# Patient Record
Sex: MALE | Race: WHITE | NOT HISPANIC OR LATINO | Employment: OTHER | URBAN - METROPOLITAN AREA
[De-identification: names, ages, dates, MRNs, and addresses within clinical notes are randomized per-mention and may not be internally consistent; named-entity substitution may affect disease eponyms.]

---

## 2022-03-14 ENCOUNTER — HOSPITAL ENCOUNTER (OUTPATIENT)
Dept: RADIOLOGY | Facility: CLINIC | Age: 37
Discharge: HOME | End: 2022-03-14
Attending: PHYSICIAN ASSISTANT
Payer: OTHER GOVERNMENT

## 2022-03-14 ENCOUNTER — OFFICE VISIT (OUTPATIENT)
Dept: URGENT CARE | Facility: CLINIC | Age: 37
End: 2022-03-14
Payer: OTHER GOVERNMENT

## 2022-03-14 VITALS
OXYGEN SATURATION: 95 % | SYSTOLIC BLOOD PRESSURE: 126 MMHG | BODY MASS INDEX: 42.95 KG/M2 | WEIGHT: 300 LBS | TEMPERATURE: 97 F | HEART RATE: 60 BPM | DIASTOLIC BLOOD PRESSURE: 76 MMHG | HEIGHT: 70 IN | RESPIRATION RATE: 18 BRPM

## 2022-03-14 DIAGNOSIS — J98.8 BACTERIAL RESPIRATORY INFECTION: Primary | ICD-10-CM

## 2022-03-14 DIAGNOSIS — J45.909 REACTIVE AIRWAY DISEASE WITH WHEEZING, UNSPECIFIED ASTHMA SEVERITY, UNCOMPLICATED: ICD-10-CM

## 2022-03-14 DIAGNOSIS — R05.8 PRODUCTIVE COUGH: ICD-10-CM

## 2022-03-14 DIAGNOSIS — B96.89 BACTERIAL RESPIRATORY INFECTION: Primary | ICD-10-CM

## 2022-03-14 PROCEDURE — 71046 XR CHEST PA AND LATERAL: ICD-10-PCS | Mod: S$GLB,,, | Performed by: RADIOLOGY

## 2022-03-14 PROCEDURE — 99203 OFFICE O/P NEW LOW 30 MIN: CPT | Mod: S$GLB,,, | Performed by: PHYSICIAN ASSISTANT

## 2022-03-14 PROCEDURE — 99203 PR OFFICE/OUTPT VISIT, NEW, LEVL III, 30-44 MIN: ICD-10-PCS | Mod: S$GLB,,, | Performed by: PHYSICIAN ASSISTANT

## 2022-03-14 PROCEDURE — 71046 X-RAY EXAM CHEST 2 VIEWS: CPT | Mod: S$GLB,,, | Performed by: RADIOLOGY

## 2022-03-14 RX ORDER — LORATADINE 10 MG/1
10 TABLET ORAL DAILY
COMMUNITY
End: 2022-12-02

## 2022-03-14 RX ORDER — MONTELUKAST SODIUM 10 MG/1
10 TABLET ORAL NIGHTLY
COMMUNITY

## 2022-03-14 RX ORDER — BUDESONIDE AND FORMOTEROL FUMARATE DIHYDRATE 80; 4.5 UG/1; UG/1
2 AEROSOL RESPIRATORY (INHALATION)
COMMUNITY
End: 2023-10-23 | Stop reason: SDUPTHER

## 2022-03-14 RX ORDER — ALBUTEROL SULFATE 1.25 MG/3ML
1.25 SOLUTION RESPIRATORY (INHALATION) EVERY 6 HOURS PRN
COMMUNITY

## 2022-03-14 RX ORDER — VENLAFAXINE 75 MG/1
75 TABLET ORAL DAILY
COMMUNITY
End: 2023-08-23

## 2022-03-14 RX ORDER — OMEPRAZOLE 40 MG/1
40 CAPSULE, DELAYED RELEASE ORAL DAILY
COMMUNITY

## 2022-03-14 RX ORDER — PROMETHAZINE HYDROCHLORIDE AND DEXTROMETHORPHAN HYDROBROMIDE 6.25; 15 MG/5ML; MG/5ML
5 SYRUP ORAL EVERY 6 HOURS PRN
Qty: 118 ML | Refills: 0 | Status: SHIPPED | OUTPATIENT
Start: 2022-03-14 | End: 2022-05-11

## 2022-03-14 RX ORDER — PREDNISONE 20 MG/1
TABLET ORAL
Qty: 7 TABLET | Refills: 0 | Status: SHIPPED | OUTPATIENT
Start: 2022-03-14 | End: 2022-05-11

## 2022-03-14 RX ORDER — DOXYCYCLINE 100 MG/1
100 CAPSULE ORAL 2 TIMES DAILY
Qty: 14 CAPSULE | Refills: 0 | Status: SHIPPED | OUTPATIENT
Start: 2022-03-14 | End: 2022-03-21

## 2022-03-14 NOTE — PROGRESS NOTES
"Subjective:       Patient ID: Elton Barrios is a 36 y.o. male.    Vitals:  height is 5' 10" (1.778 m) and weight is 136.1 kg (300 lb). His temperature is 97.4 °F (36.3 °C). His blood pressure is 126/76 and his pulse is 60. His respiration is 18 and oxygen saturation is 95%.     Chief Complaint: Sinus Problem and Cough    Patient is 36-year-old male who presents to clinic with sinus congestion/pressure, chest congestion and productive cough for 1 week.  He has been taking Mucinex, Flonase and his daily antihistamines with no relief.  Patient states that the cough seems to has gotten worse since onset.  He does have history of asthma and states that he has used his inhaler more than he usually does.  He does have nebulizer treatments at home but states he has not needed them. Denies any fever, chest pain. No covid of flu exposure.     Sinus Problem  This is a new problem. The current episode started in the past 7 days (3/9/22). The problem has been gradually worsening since onset. There has been no fever. His pain is at a severity of 0/10. He is experiencing no pain. Associated symptoms include congestion, coughing, diaphoresis, ear pain, headaches, a hoarse voice, shortness of breath, sinus pressure and a sore throat. Pertinent negatives include no chills, sneezing or swollen glands. (Plugged ears) Treatments tried: flonase, mucinex, allergy pills. The treatment provided no relief.   Cough  Associated symptoms include ear pain, headaches, a sore throat and shortness of breath. Pertinent negatives include no chills, fever or myalgias.       Constitution: Positive for sweating. Negative for chills and fever.   HENT: Positive for ear pain, congestion, sinus pressure and sore throat. Negative for trouble swallowing and voice change.    Cardiovascular: Negative.    Respiratory: Positive for cough, sputum production, shortness of breath and asthma.    Gastrointestinal: Negative.    Musculoskeletal: Negative for muscle " ache.   Skin: Negative.    Allergic/Immunologic: Positive for asthma. Negative for sneezing.   Neurological: Positive for headaches.       Objective:      Physical Exam   Constitutional: He appears well-developed.  Non-toxic appearance. He does not appear ill. No distress.   HENT:   Head: Normocephalic and atraumatic.   Ears:   Right Ear: Tympanic membrane and external ear normal.   Left Ear: Tympanic membrane and external ear normal.   Nose: Mucosal edema and congestion present.   Eyes: Conjunctivae and EOM are normal.   Neck: Neck supple.   Pulmonary/Chest: Effort normal. No respiratory distress. He has wheezes (expiratory wheezes throughout).    Comments: Frequent coughing    Abdominal: Normal appearance.   Musculoskeletal: Normal range of motion.         General: Normal range of motion.   Neurological: no focal deficit. He is alert. He displays no weakness. Gait normal.   Skin: Skin is warm, dry, not diaphoretic, not pale and no rash.   Psychiatric: His behavior is normal.       XR CHEST PA AND LATERAL    Result Date: 3/14/2022  EXAMINATION: XR CHEST PA AND LATERAL CLINICAL HISTORY: Other specified cough TECHNIQUE: PA and lateral views of the chest were performed. COMPARISON: None. FINDINGS: No focal or confluent pulmonary infiltrate. No pleural fluid or pneumothorax. The cardiomediastinal silhouette is unremarkable Osseous structures are intact.     Negative chest. Electronically signed by: Aman Zaldivar Date:    03/14/2022 Time:    19:09    Assessment:       1. Bacterial respiratory infection    2. Productive cough    3. Reactive airway disease with wheezing, unspecified asthma severity, uncomplicated          Plan:       No acute distress or c/o sob but offered to do breathing treatment in clinic. Pt declined and says he will continue his inhaler and home treatments as needed. Take full course of antibiotics and cough syrup prn. Recommend close f/u with PCP if symptoms worsen or fail to improve.     Bacterial  respiratory infection  -     doxycycline (VIBRAMYCIN) 100 MG Cap; Take 1 capsule (100 mg total) by mouth 2 (two) times daily. for 7 days  Dispense: 14 capsule; Refill: 0    Productive cough  -     XR CHEST PA AND LATERAL; Future; Expected date: 03/14/2022  -     promethazine-dextromethorphan (PROMETHAZINE-DM) 6.25-15 mg/5 mL Syrp; Take 5 mLs by mouth every 6 (six) hours as needed (cough).  Dispense: 118 mL; Refill: 0  -     predniSONE (DELTASONE) 20 MG tablet; Take 1 tablet twice daily for 2 days. Then take 1 tablet daily for 3 days.  Dispense: 7 tablet; Refill: 0    Reactive airway disease with wheezing, unspecified asthma severity, uncomplicated  -     XR CHEST PA AND LATERAL; Future; Expected date: 03/14/2022  -     predniSONE (DELTASONE) 20 MG tablet; Take 1 tablet twice daily for 2 days. Then take 1 tablet daily for 3 days.  Dispense: 7 tablet; Refill: 0

## 2022-03-17 ENCOUNTER — TELEPHONE (OUTPATIENT)
Dept: URGENT CARE | Facility: CLINIC | Age: 37
End: 2022-03-17
Payer: OTHER GOVERNMENT

## 2022-03-17 NOTE — TELEPHONE ENCOUNTER
Courtesy call to patient. Patient states he is feeling much better and has no questions or concerns.

## 2022-05-11 ENCOUNTER — OFFICE VISIT (OUTPATIENT)
Dept: FAMILY MEDICINE | Facility: CLINIC | Age: 37
End: 2022-05-11
Payer: OTHER GOVERNMENT

## 2022-05-11 VITALS
DIASTOLIC BLOOD PRESSURE: 76 MMHG | SYSTOLIC BLOOD PRESSURE: 120 MMHG | WEIGHT: 307 LBS | TEMPERATURE: 98 F | HEIGHT: 70 IN | BODY MASS INDEX: 43.95 KG/M2 | HEART RATE: 69 BPM | OXYGEN SATURATION: 97 %

## 2022-05-11 DIAGNOSIS — F90.2 ATTENTION DEFICIT HYPERACTIVITY DISORDER (ADHD), COMBINED TYPE: ICD-10-CM

## 2022-05-11 DIAGNOSIS — F43.10 PTSD (POST-TRAUMATIC STRESS DISORDER): ICD-10-CM

## 2022-05-11 DIAGNOSIS — E66.01 CLASS 3 SEVERE OBESITY WITH BODY MASS INDEX (BMI) OF 40.0 TO 44.9 IN ADULT, UNSPECIFIED OBESITY TYPE, UNSPECIFIED WHETHER SERIOUS COMORBIDITY PRESENT: ICD-10-CM

## 2022-05-11 DIAGNOSIS — Z23 IMMUNIZATION DUE: Primary | ICD-10-CM

## 2022-05-11 DIAGNOSIS — J45.20 MILD INTERMITTENT ASTHMA WITHOUT COMPLICATION: ICD-10-CM

## 2022-05-11 DIAGNOSIS — Z11.59 NEED FOR HEPATITIS C SCREENING TEST: ICD-10-CM

## 2022-05-11 DIAGNOSIS — K21.9 GASTROESOPHAGEAL REFLUX DISEASE WITHOUT ESOPHAGITIS: ICD-10-CM

## 2022-05-11 DIAGNOSIS — Z11.4 SCREENING FOR HIV (HUMAN IMMUNODEFICIENCY VIRUS): ICD-10-CM

## 2022-05-11 PROBLEM — E66.813 CLASS 3 SEVERE OBESITY WITH BODY MASS INDEX (BMI) OF 40.0 TO 44.9 IN ADULT: Status: ACTIVE | Noted: 2022-05-11

## 2022-05-11 PROCEDURE — 99999 PR PBB SHADOW E&M-EST. PATIENT-LVL IV: CPT | Mod: PBBFAC,,, | Performed by: STUDENT IN AN ORGANIZED HEALTH CARE EDUCATION/TRAINING PROGRAM

## 2022-05-11 PROCEDURE — 90677 PCV20 VACCINE IM: CPT | Mod: PBBFAC,PO

## 2022-05-11 PROCEDURE — 99204 PR OFFICE/OUTPT VISIT, NEW, LEVL IV, 45-59 MIN: ICD-10-PCS | Mod: S$PBB,,, | Performed by: STUDENT IN AN ORGANIZED HEALTH CARE EDUCATION/TRAINING PROGRAM

## 2022-05-11 PROCEDURE — 99204 OFFICE O/P NEW MOD 45 MIN: CPT | Mod: S$PBB,,, | Performed by: STUDENT IN AN ORGANIZED HEALTH CARE EDUCATION/TRAINING PROGRAM

## 2022-05-11 PROCEDURE — 99999 PR PBB SHADOW E&M-EST. PATIENT-LVL IV: ICD-10-PCS | Mod: PBBFAC,,, | Performed by: STUDENT IN AN ORGANIZED HEALTH CARE EDUCATION/TRAINING PROGRAM

## 2022-05-11 PROCEDURE — 99214 OFFICE O/P EST MOD 30 MIN: CPT | Mod: PBBFAC,PO | Performed by: STUDENT IN AN ORGANIZED HEALTH CARE EDUCATION/TRAINING PROGRAM

## 2022-05-11 RX ORDER — METHYLPHENIDATE HYDROCHLORIDE 10 MG/1
10 CAPSULE, EXTENDED RELEASE ORAL EVERY MORNING
Qty: 30 CAPSULE | Refills: 0 | Status: SHIPPED | OUTPATIENT
Start: 2022-05-11 | End: 2022-06-08

## 2022-05-11 NOTE — PROGRESS NOTES
Problem List Items Addressed This Visit        Psychiatric    PTSD (post-traumatic stress disorder)    Overview     War ; stable on Effexor 75 mg   Denies SI/HI; hallucinations              Attention deficit hyperactivity disorder (ADHD), combined type    Overview     Chronic hx; previously on methylphenidate 20mg daily; has been off for few years. Would like to restart  - will start methylphenidate 10mg daily    Denies mood instability, irritability, aggression, palpitations, chest pain, weight loss, decreased appetite, disordered sleep  Pt is demonstrating no behaviors to suggest inappropriate use of prescribed medications.    [place] Board [employer] Pharmacy Controlled Prescription Drug Monitoring database was queried and showed no activity to suggest abuse, diversion, or other inappropriate use of prescription medications.  #30 tabs, 0 refills   1m VV             Relevant Medications    methylphenidate HCl (RITALIN [place]) 10 MG 24 hr capsule    Other Relevant Orders    CBC Auto Differential    Comprehensive Metabolic Panel    Hemoglobin A1C    Lipid Panel    TSH       Pulmonary    Mild intermittent asthma    Overview     Well controlled with Symbicort, Claritin, and singular; seldom use of albuterol              Endocrine    Class 3 severe obesity with body mass index (BMI) of 40.0 to 44.9 in adult    Overview     The patient and I had a discussion about obesity and ways to treat it.  At this time, we agreed to use the following to address this:    General weight loss/lifestyle modification strategies discussed (elicit support from others; identify saboteurs; non-food rewards, etc).  Informal exercise measures discussed, e.g. taking stairs instead of elevator.  Regular aerobic exercise program discussed.                Relevant Orders    CBC Auto Differential    Comprehensive Metabolic Panel    Hemoglobin A1C    Lipid Panel    TSH       GI    Gastroesophageal reflux disease without esophagitis    Overview      -symptoms controlled with daily PPI  -denies alarm symptoms, such as dysphagia, weight loss or N/V  -continue lifestyle modification with avoidance of acidic foods, caffeine, late night eating               Relevant Orders    CBC Auto Differential    Comprehensive Metabolic Panel    Hemoglobin A1C    Lipid Panel    TSH      Other Visit Diagnoses     Immunization due    -  Primary    Relevant Orders    (In Office Administered) Pneumococcal Conjugate Vaccine (20 Valent) (IM) (Completed)    Screening for HIV (human immunodeficiency virus)        Relevant Orders    HIV 1/2 Ag/Ab (4th Gen)    Need for hepatitis C screening test        Relevant Orders    Hepatitis C Antibody            Patient ID: Elton Barrios is a 37 y.o. male.    Chief Complaint:  establish care    [facility]      Patient is here to establish care. Has a hx of  has a past medical history of ADHD (attention deficit hyperactivity disorder), Allergy, Anxiety, Asthma, Depression, and GERD (gastroesophageal reflux disease).       Pt diagnosed with ADD/ADHD in 2013. Has tried methylphenidate 20mg, last had couple years ago. This medication helps to control lack of focus. Denies mood instability, irritability, aggression, palpitations, chest pain, weight loss, decreased appetite, disordered sleep.        Colonoscopy: No personal history of colon cancer, hematochezia, melena, crohn's, ulcerative colitis; No family history of colon cancer.      Health Maintenance Topics with due status: Not Due       Topic Last Completion Date    Influenza Vaccine Not Due        ==============================================  History reviewed.   Health Maintenance Due   Topic Date Due    Hepatitis C Screening  Never done    Lipid Panel  Never done    HIV Screening  Never done    TETANUS VACCINE  Never done    COVID-19 Vaccine (2 - Moderna series) 01/11/2022       Past Medical History:  Past Medical History:   Diagnosis Date    ADHD (attention deficit  hyperactivity disorder)     Allergy     Anxiety     Asthma     Depression     GERD (gastroesophageal reflux disease)      Past Surgical History:   Procedure Laterality Date    ANKLE FRACTURE SURGERY Left 2006    ANKLE SURGERY Left 2006    bone infection    APPENDECTOMY      KNEE ASPIRATION Right 2005     Review of patient's allergies indicates:  No Known Allergies  Current Outpatient Medications on File Prior to Visit   Medication Sig Dispense Refill    albuterol (ACCUNEB) 1.25 mg/3 mL Nebu Take 1.25 mg by nebulization every 6 (six) hours as needed. Rescue      budesonide-formoterol 80-4.5 mcg (SYMBICORT) 80-4.5 mcg/actuation HFAA Inhale 2 puffs into the lungs. Controller      loratadine (CLARITIN) 10 mg tablet Take 10 mg by mouth once daily.      montelukast (SINGULAIR) 10 mg tablet Take 10 mg by mouth every evening.      omeprazole (PRILOSEC) 40 MG capsule Take 40 mg by mouth once daily.      venlafaxine (EFFEXOR) 75 MG tablet Take 75 mg by mouth once daily at 6am.      [DISCONTINUED] predniSONE (DELTASONE) 20 MG tablet Take 1 tablet twice daily for 2 days. Then take 1 tablet daily for 3 days. (Patient not taking: Reported on 5/11/2022) 7 tablet 0    [DISCONTINUED] promethazine-dextromethorphan (PROMETHAZINE-DM) 6.25-15 mg/5 mL Syrp Take 5 mLs by mouth every 6 (six) hours as needed (cough). (Patient not taking: Reported on 5/11/2022) 118 mL 0     No current facility-administered medications on file prior to visit.     Social History     Socioeconomic History    Marital status:    Tobacco Use    Smoking status: Current Every Day Smoker     Packs/day: 0.50     Years: 20.00     Pack years: 10.00     Types: Cigarettes    Smokeless tobacco: Never Used     History reviewed. No pertinent family history.       Review of Systems   12 point review of systems per hpi, otherwise negative         Objective:    Nursing note and vitals reviewed.  Vitals:    05/11/22 1359   BP: 120/76   Pulse: 69    Temp: 98.1 °F (36.7 °C)     Body mass index is 44.05 kg/m².     Physical Exam   Constitutional:oriented to person, place, and time. appears well-developed and well-nourished. No distress.   HENT: WNL  Head: Normocephalic and atraumatic.   Eyes: Pupils are equal, round, and reactive to light. EOM are normal.   Neck: Normal range of motion. Neck supple.   Cardiovascular: Normal rate, regular rhythm, normal heart sounds and intact distal pulses.   No murmur heard.  Pulmonary/Chest: Effort normal and breath sounds normal. No respiratory distress. no wheezes.   GI: soft, non distended, no ttp, no rebound/guarding  Musculoskeletal: Normal range of motion. no edema.   Neurological: alert and oriented to person, place, and time. No cranial nerve deficit.   Skin: Skin is warm and dry. Capillary refill takes less than 2 seconds.   Psychiatric: normal mood and affect. behavior is normal.           Kena Bourgeois MD    We Offer Telehealth & Same Day Appointments!   Book your Telehealth appointment with me through my nurse or   Clinic appointments on [employer]!  [phone]     To Schedule appointments online, go to [employer]: [email or web address]

## 2022-05-12 ENCOUNTER — LAB VISIT (OUTPATIENT)
Dept: LAB | Facility: HOSPITAL | Age: 37
End: 2022-05-12
Attending: STUDENT IN AN ORGANIZED HEALTH CARE EDUCATION/TRAINING PROGRAM
Payer: OTHER GOVERNMENT

## 2022-05-12 DIAGNOSIS — Z11.59 NEED FOR HEPATITIS C SCREENING TEST: ICD-10-CM

## 2022-05-12 DIAGNOSIS — E66.01 CLASS 3 SEVERE OBESITY WITH BODY MASS INDEX (BMI) OF 40.0 TO 44.9 IN ADULT, UNSPECIFIED OBESITY TYPE, UNSPECIFIED WHETHER SERIOUS COMORBIDITY PRESENT: ICD-10-CM

## 2022-05-12 DIAGNOSIS — K21.9 GASTROESOPHAGEAL REFLUX DISEASE WITHOUT ESOPHAGITIS: ICD-10-CM

## 2022-05-12 DIAGNOSIS — F90.2 ATTENTION DEFICIT HYPERACTIVITY DISORDER (ADHD), COMBINED TYPE: ICD-10-CM

## 2022-05-12 DIAGNOSIS — Z11.4 SCREENING FOR HIV (HUMAN IMMUNODEFICIENCY VIRUS): ICD-10-CM

## 2022-05-12 LAB
ALBUMIN SERPL BCP-MCNC: 4.1 G/DL (ref 3.5–5.2)
ALP SERPL-CCNC: 98 U/L (ref 55–135)
ALT SERPL W/O P-5'-P-CCNC: 35 U/L (ref 10–44)
ANION GAP SERPL CALC-SCNC: 9 MMOL/L (ref 8–16)
AST SERPL-CCNC: 28 U/L (ref 10–40)
BASOPHILS # BLD AUTO: 0.04 K/UL (ref 0–0.2)
BASOPHILS NFR BLD: 0.4 % (ref 0–1.9)
BILIRUB SERPL-MCNC: 0.5 MG/DL (ref 0.1–1)
BUN SERPL-MCNC: 13 MG/DL (ref 6–20)
CALCIUM SERPL-MCNC: 9.5 MG/DL (ref 8.7–10.5)
CHLORIDE SERPL-SCNC: 104 MMOL/L (ref 95–110)
CHOLEST SERPL-MCNC: 213 MG/DL (ref 120–199)
CHOLEST/HDLC SERPL: 6.9 {RATIO} (ref 2–5)
CO2 SERPL-SCNC: 26 MMOL/L (ref 23–29)
CREAT SERPL-MCNC: 0.8 MG/DL (ref 0.5–1.4)
DIFFERENTIAL METHOD: ABNORMAL
EOSINOPHIL # BLD AUTO: 0.3 K/UL (ref 0–0.5)
EOSINOPHIL NFR BLD: 2.7 % (ref 0–8)
ERYTHROCYTE [DISTWIDTH] IN BLOOD BY AUTOMATED COUNT: 12.9 % (ref 11.5–14.5)
EST. GFR  (AFRICAN AMERICAN): >60 ML/MIN/1.73 M^2
EST. GFR  (NON AFRICAN AMERICAN): >60 ML/MIN/1.73 M^2
ESTIMATED AVG GLUCOSE: 117 MG/DL (ref 68–131)
GLUCOSE SERPL-MCNC: 113 MG/DL (ref 70–110)
HBA1C MFR BLD: 5.7 % (ref 4–5.6)
HCT VFR BLD AUTO: 46.6 % (ref 40–54)
HDLC SERPL-MCNC: 31 MG/DL (ref 40–75)
HDLC SERPL: 14.6 % (ref 20–50)
HGB BLD-MCNC: 14.8 G/DL (ref 14–18)
IMM GRANULOCYTES # BLD AUTO: 0.03 K/UL (ref 0–0.04)
IMM GRANULOCYTES NFR BLD AUTO: 0.3 % (ref 0–0.5)
LDLC SERPL CALC-MCNC: 113.8 MG/DL (ref 63–159)
LYMPHOCYTES # BLD AUTO: 3.2 K/UL (ref 1–4.8)
LYMPHOCYTES NFR BLD: 28.4 % (ref 18–48)
MCH RBC QN AUTO: 27.7 PG (ref 27–31)
MCHC RBC AUTO-ENTMCNC: 31.8 G/DL (ref 32–36)
MCV RBC AUTO: 87 FL (ref 82–98)
MONOCYTES # BLD AUTO: 1 K/UL (ref 0.3–1)
MONOCYTES NFR BLD: 8.8 % (ref 4–15)
NEUTROPHILS # BLD AUTO: 6.7 K/UL (ref 1.8–7.7)
NEUTROPHILS NFR BLD: 59.4 % (ref 38–73)
NONHDLC SERPL-MCNC: 182 MG/DL
NRBC BLD-RTO: 0 /100 WBC
PLATELET # BLD AUTO: 257 K/UL (ref 150–450)
PMV BLD AUTO: 9.9 FL (ref 9.2–12.9)
POTASSIUM SERPL-SCNC: 4.1 MMOL/L (ref 3.5–5.1)
PROT SERPL-MCNC: 6.9 G/DL (ref 6–8.4)
RBC # BLD AUTO: 5.34 M/UL (ref 4.6–6.2)
SODIUM SERPL-SCNC: 139 MMOL/L (ref 136–145)
TRIGL SERPL-MCNC: 341 MG/DL (ref 30–150)
TSH SERPL DL<=0.005 MIU/L-ACNC: 1.07 UIU/ML (ref 0.4–4)
WBC # BLD AUTO: 11.32 K/UL (ref 3.9–12.7)

## 2022-05-12 PROCEDURE — 84443 ASSAY THYROID STIM HORMONE: CPT | Performed by: STUDENT IN AN ORGANIZED HEALTH CARE EDUCATION/TRAINING PROGRAM

## 2022-05-12 PROCEDURE — 86803 HEPATITIS C AB TEST: CPT | Performed by: STUDENT IN AN ORGANIZED HEALTH CARE EDUCATION/TRAINING PROGRAM

## 2022-05-12 PROCEDURE — 87389 HIV-1 AG W/HIV-1&-2 AB AG IA: CPT | Performed by: STUDENT IN AN ORGANIZED HEALTH CARE EDUCATION/TRAINING PROGRAM

## 2022-05-12 PROCEDURE — 85025 COMPLETE CBC W/AUTO DIFF WBC: CPT | Performed by: STUDENT IN AN ORGANIZED HEALTH CARE EDUCATION/TRAINING PROGRAM

## 2022-05-12 PROCEDURE — 83036 HEMOGLOBIN GLYCOSYLATED A1C: CPT | Performed by: STUDENT IN AN ORGANIZED HEALTH CARE EDUCATION/TRAINING PROGRAM

## 2022-05-12 PROCEDURE — 36415 COLL VENOUS BLD VENIPUNCTURE: CPT | Mod: PO | Performed by: STUDENT IN AN ORGANIZED HEALTH CARE EDUCATION/TRAINING PROGRAM

## 2022-05-12 PROCEDURE — 80053 COMPREHEN METABOLIC PANEL: CPT | Performed by: STUDENT IN AN ORGANIZED HEALTH CARE EDUCATION/TRAINING PROGRAM

## 2022-05-12 PROCEDURE — 80061 LIPID PANEL: CPT | Performed by: STUDENT IN AN ORGANIZED HEALTH CARE EDUCATION/TRAINING PROGRAM

## 2022-05-13 DIAGNOSIS — E78.00 HYPERCHOLESTEROLEMIA: ICD-10-CM

## 2022-05-13 DIAGNOSIS — R73.03 PREDIABETES: Primary | ICD-10-CM

## 2022-05-13 RX ORDER — METFORMIN HYDROCHLORIDE 500 MG/1
500 TABLET, EXTENDED RELEASE ORAL NIGHTLY
Qty: 90 TABLET | Refills: 3 | Status: SHIPPED | OUTPATIENT
Start: 2022-05-13 | End: 2022-10-17 | Stop reason: SDUPTHER

## 2022-05-15 ENCOUNTER — PATIENT MESSAGE (OUTPATIENT)
Dept: ADMINISTRATIVE | Facility: OTHER | Age: 37
End: 2022-05-15
Payer: OTHER GOVERNMENT

## 2022-05-16 ENCOUNTER — LAB VISIT (OUTPATIENT)
Dept: PRIMARY CARE CLINIC | Facility: OTHER | Age: 37
End: 2022-05-16
Attending: INTERNAL MEDICINE
Payer: OTHER GOVERNMENT

## 2022-05-16 DIAGNOSIS — Z20.822 ENCOUNTER FOR LABORATORY TESTING FOR COVID-19 VIRUS: ICD-10-CM

## 2022-05-16 PROCEDURE — U0003 INFECTIOUS AGENT DETECTION BY NUCLEIC ACID (DNA OR RNA); SEVERE ACUTE RESPIRATORY SYNDROME CORONAVIRUS 2 (SARS-COV-2) (CORONAVIRUS DISEASE [COVID-19]), AMPLIFIED PROBE TECHNIQUE, MAKING USE OF HIGH THROUGHPUT TECHNOLOGIES AS DESCRIBED BY CMS-2020-01-R: HCPCS | Performed by: INTERNAL MEDICINE

## 2022-05-17 LAB
HCV AB SERPL QL IA: NEGATIVE
HIV 1+2 AB+HIV1 P24 AG SERPL QL IA: NEGATIVE
SARS-COV-2 RNA RESP QL NAA+PROBE: NOT DETECTED
SARS-COV-2- CYCLE NUMBER: NORMAL

## 2022-05-30 ENCOUNTER — PATIENT MESSAGE (OUTPATIENT)
Dept: FAMILY MEDICINE | Facility: CLINIC | Age: 37
End: 2022-05-30
Payer: OTHER GOVERNMENT

## 2022-06-08 ENCOUNTER — OFFICE VISIT (OUTPATIENT)
Dept: FAMILY MEDICINE | Facility: CLINIC | Age: 37
End: 2022-06-08
Payer: OTHER GOVERNMENT

## 2022-06-08 DIAGNOSIS — F90.2 ATTENTION DEFICIT HYPERACTIVITY DISORDER (ADHD), COMBINED TYPE: Primary | ICD-10-CM

## 2022-06-08 PROCEDURE — 99441 PR PHYSICIAN TELEPHONE EVALUATION 5-10 MIN: CPT | Mod: 95,,, | Performed by: STUDENT IN AN ORGANIZED HEALTH CARE EDUCATION/TRAINING PROGRAM

## 2022-06-08 PROCEDURE — 99441 PR PHYSICIAN TELEPHONE EVALUATION 5-10 MIN: ICD-10-PCS | Mod: 95,,, | Performed by: STUDENT IN AN ORGANIZED HEALTH CARE EDUCATION/TRAINING PROGRAM

## 2022-06-08 RX ORDER — METHYLPHENIDATE HYDROCHLORIDE 10 MG/1
10 TABLET ORAL 2 TIMES DAILY WITH MEALS
Qty: 60 TABLET | Refills: 0 | Status: SHIPPED | OUTPATIENT
Start: 2022-06-08 | End: 2022-09-06 | Stop reason: SDUPTHER

## 2022-06-08 NOTE — PROGRESS NOTES
Established Patient - Audio Only Telehealth Visit     The patient location is: [place]  The chief complaint leading to consultation is: ADD  Visit type: Virtual visit with audio only (telephone)  Total time spent with patient: 10 minutes       The reason for the audio only service rather than synchronous audio and video virtual visit was related to technical difficulties or patient preference/necessity.     Each patient to whom I provide medical services by telemedicine is:  (1) informed of the relationship between the physician and patient and the respective role of any other health care provider with respect to management of the patient; and (2) notified that they may decline to receive medical services by telemedicine and may withdraw from such care at any time. Patient verbally consented to receive this service via voice-only telephone call.       HPI:   Pt diagnosed with ADD/ADHD in 2013. Has tried methylphenidate 20mg, last had couple years ago. Restarted on ritalin 10 mg daily 1 month ago. Reports 10 mg daily only lasts until noon. This medication helps to control lack of focus. Denies mood instability, irritability, aggression, palpitations, chest pain, weight loss, decreased appetite, disordered sleep.             Problem List Items Addressed This Visit        Psychiatric    Attention deficit hyperactivity disorder (ADHD), combined type - Primary    Overview     Chronic hx; previously on methylphenidate 20mg daily  Reports 10 mg daily only lasts until noon.  - will increase to methylphenidate 10 mg BID    Denies mood instability, irritability, aggression, palpitations, chest pain, weight loss, decreased appetite, disordered sleep  Pt is demonstrating no behaviors to suggest inappropriate use of prescribed medications.    [place] Board of Pharmacy Controlled Prescription Drug Monitoring database was queried and showed no activity to suggest abuse, diversion, or other inappropriate use of prescription  medications.  #30 tabs, 0 refills   1m VV                                This service was not originating from a related E/M service provided within the previous 7 days nor will  to an E/M service or procedure within the next 24 hours or my soonest available appointment.  Prevailing standard of care was able to be met in this audio-only visit.        Answers for HPI/ROS submitted by the patient on 6/8/2022  activity change: No  unexpected weight change: No  neck pain: No  hearing loss: No  rhinorrhea: No  trouble swallowing: No  eye discharge: No  visual disturbance: No  chest tightness: No  wheezing: No  chest pain: No  palpitations: No  blood in stool: No  constipation: No  vomiting: No  diarrhea: No  polydipsia: No  polyuria: No  difficulty urinating: No  urgency: No  hematuria: No  joint swelling: No  arthralgias: No  headaches: No  weakness: No  confusion: No  dysphoric mood: No

## 2022-09-06 ENCOUNTER — OFFICE VISIT (OUTPATIENT)
Dept: FAMILY MEDICINE | Facility: CLINIC | Age: 37
End: 2022-09-06
Payer: OTHER GOVERNMENT

## 2022-09-06 DIAGNOSIS — E66.01 CLASS 3 SEVERE OBESITY DUE TO EXCESS CALORIES WITH SERIOUS COMORBIDITY AND BODY MASS INDEX (BMI) OF 40.0 TO 44.9 IN ADULT: ICD-10-CM

## 2022-09-06 DIAGNOSIS — F90.2 ATTENTION DEFICIT HYPERACTIVITY DISORDER (ADHD), COMBINED TYPE: ICD-10-CM

## 2022-09-06 DIAGNOSIS — R53.83 FATIGUE, UNSPECIFIED TYPE: Primary | ICD-10-CM

## 2022-09-06 DIAGNOSIS — R53.83 OTHER FATIGUE: ICD-10-CM

## 2022-09-06 PROCEDURE — 99214 PR OFFICE/OUTPT VISIT, EST, LEVL IV, 30-39 MIN: ICD-10-PCS | Mod: 95,,, | Performed by: STUDENT IN AN ORGANIZED HEALTH CARE EDUCATION/TRAINING PROGRAM

## 2022-09-06 PROCEDURE — 99214 OFFICE O/P EST MOD 30 MIN: CPT | Mod: 95,,, | Performed by: STUDENT IN AN ORGANIZED HEALTH CARE EDUCATION/TRAINING PROGRAM

## 2022-09-06 RX ORDER — METHYLPHENIDATE HYDROCHLORIDE 10 MG/1
10 TABLET ORAL 2 TIMES DAILY WITH MEALS
Qty: 60 TABLET | Refills: 0 | Status: SHIPPED | OUTPATIENT
Start: 2022-09-06 | End: 2022-10-06

## 2022-09-06 RX ORDER — METHYLPHENIDATE HYDROCHLORIDE 10 MG/1
10 TABLET ORAL 2 TIMES DAILY WITH MEALS
Qty: 60 TABLET | Refills: 0 | Status: SHIPPED | OUTPATIENT
Start: 2022-10-06 | End: 2022-11-05

## 2022-09-06 RX ORDER — METHYLPHENIDATE HYDROCHLORIDE 10 MG/1
10 TABLET ORAL 2 TIMES DAILY WITH MEALS
Qty: 60 TABLET | Refills: 0 | Status: CANCELLED | OUTPATIENT
Start: 2022-09-06 | End: 2022-10-06

## 2022-09-06 RX ORDER — METHYLPHENIDATE HYDROCHLORIDE 10 MG/1
10 TABLET ORAL 2 TIMES DAILY WITH MEALS
Qty: 60 TABLET | Refills: 0 | Status: SHIPPED | OUTPATIENT
Start: 2022-11-06 | End: 2022-12-06

## 2022-09-06 NOTE — PROGRESS NOTES
The patient location is: [place]  The chief complaint leading to consultation is: add med refill    Visit type: audiovisual    Time with patient: 10 minutes  15 minutes of total time spent on the encounter, which includes face to face time and non-face to face time preparing to see the patient (eg, review of tests), Obtaining and/or reviewing separately obtained history, Documenting clinical information in the electronic or other health record, Independently interpreting results (not separately reported) and communicating results to the patient/family/caregiver, or Care coordination (not separately reported).     Each patient to whom he or she provides medical services by telemedicine is:  (1) informed of the relationship between the physician and patient and the respective role of any other health care provider with respect to management of the patient; and (2) notified that he or she may decline to receive medical services by telemedicine and may withdraw from such care at any time.       Problem List Items Addressed This Visit          Psychiatric    Attention deficit hyperactivity disorder (ADHD), combined type    Overview     Chronic hx; doing well on methylphenidate 10 mg BID    Denies mood instability, irritability, aggression, palpitations, chest pain, weight loss, decreased appetite, disordered sleep  Pt is demonstrating no behaviors to suggest inappropriate use of prescribed medications.    [place] Board of Pharmacy Controlled Prescription Drug Monitoring database was queried and showed no activity to suggest abuse, diversion, or other inappropriate use of prescription medications.  #60 tabs, 2 refills   3m in office            Relevant Medications    methylphenidate HCl (RITALIN) 10 MG tablet    methylphenidate HCl (RITALIN) 10 MG tablet (Start on 10/6/2022)    methylphenidate HCl (RITALIN) 10 MG tablet (Start on 11/6/2022)       Endocrine    Class 3 severe obesity with body mass index (BMI) of 40.0 to 44.9  in adult    Overview     Wt Readings from Last 3 Encounters:   05/11/22 1359 (!) 139.3 kg (307 lb)   03/14/22 1818 136.1 kg (300 lb)   General weight loss/lifestyle modification strategies discussed: limit sugary drinks, exercise 3-5x per week  Informal exercise measures discussed, e.g. taking stairs instead of elevator.                    Other    Fatigue - Primary    Overview     New problem. Denies assoc anxiety and depression  SHAYY on CPAP   - CBC in May wnl  - send for testosterone, bmp, a1c, UA   Prn return          Relevant Orders    Hemoglobin A1C    Urinalysis    Basic Metabolic Panel    Testosterone    CBC Auto Differential           Follow up in about 3 months (around 12/6/2022) for in office.    Kena Bourgeois MD  _________________________________________________________________________      Patient ID: Elton Barrios is a 37 y.o. male.    Pt diagnosed with ADD/ADHD in 2013. Has tried methylphenidate 20mg, last had couple years ago. Restarted on ritalin 10 mg daily 1 month ago. Reports 10 mg daily only lasts until noon. This medication helps to control lack of focus. Denies mood instability, irritability, aggression, palpitations, chest pain, weight loss, decreased appetite, disordered sleep.      Fatigue for past few months with decreased libido. Reports ritalin has been helping with focus; however, not with energy. Reports he has been trying to eat better and be more active. of note, hx of shayy on cpap. Reports recent visit with pulm revealed sleeping well, about 6-8 hours nightly.    Denies fevers, chills, chest pain, SOB.      Past medical histories reviewed, including past medical, surgical, family and social histories.      Current Outpatient Medications on File Prior to Visit   Medication Sig Dispense Refill    albuterol (ACCUNEB) 1.25 mg/3 mL Nebu Take 1.25 mg by nebulization every 6 (six) hours as needed. Rescue      budesonide-formoterol 80-4.5 mcg (SYMBICORT) 80-4.5 mcg/actuation HFAA Inhale  2 puffs into the lungs. Controller      loratadine (CLARITIN) 10 mg tablet Take 10 mg by mouth once daily.      metFORMIN (GLUCOPHAGE-XR) 500 MG ER 24hr tablet Take 1 tablet (500 mg total) by mouth every evening. 90 tablet 3    montelukast (SINGULAIR) 10 mg tablet Take 10 mg by mouth every evening.      omeprazole (PRILOSEC) 40 MG capsule Take 40 mg by mouth once daily.      venlafaxine (EFFEXOR) 75 MG tablet Take 75 mg by mouth once daily at 6am.      [DISCONTINUED] methylphenidate HCl (RITALIN) 10 MG tablet Take 1 tablet (10 mg total) by mouth 2 (two) times daily with meals. 60 tablet 0     No current facility-administered medications on file prior to visit.       Review of Systems   12 point review of systems negative except for listed in HPI.     Objective:    Nursing note and vitals reviewed.  There were no vitals filed for this visit.  There is no height or weight on file to calculate BMI.     Physical Exam   No vitals or full physical exam obtained as this is a virtual visit  Gen: no distress, comfortable          We Offer Telehealth & Same Day Appointments!   Book your Telehealth appointment with me through my nurse or   Clinic appointments on [employer]!  [phone]     To Schedule appointments online, go to [employer]: [email or web address]       Answers submitted by the patient for this visit:  Review of Systems Questionnaire (Submitted on 9/6/2022)  activity change: No  unexpected weight change: No  neck pain: No  hearing loss: No  rhinorrhea: No  trouble swallowing: No  eye discharge: No  visual disturbance: No  chest tightness: No  wheezing: No  chest pain: No  palpitations: No  blood in stool: No  constipation: No  vomiting: No  diarrhea: No  polydipsia: No  polyuria: No  difficulty urinating: No  urgency: No  hematuria: No  joint swelling: No  arthralgias: Yes  headaches: No  weakness: Yes (reports able to walk, no dysarthria, no extremity weakness)   confusion: Yes (AOX3  on VV)  dysphoric mood: No

## 2022-09-07 ENCOUNTER — LAB VISIT (OUTPATIENT)
Dept: LAB | Facility: HOSPITAL | Age: 37
End: 2022-09-07
Attending: STUDENT IN AN ORGANIZED HEALTH CARE EDUCATION/TRAINING PROGRAM
Payer: OTHER GOVERNMENT

## 2022-09-07 DIAGNOSIS — R53.83 FATIGUE, UNSPECIFIED TYPE: ICD-10-CM

## 2022-09-07 LAB
ANION GAP SERPL CALC-SCNC: 13 MMOL/L (ref 8–16)
BASOPHILS # BLD AUTO: 0.04 K/UL (ref 0–0.2)
BASOPHILS NFR BLD: 0.4 % (ref 0–1.9)
BUN SERPL-MCNC: 11 MG/DL (ref 6–20)
CALCIUM SERPL-MCNC: 9.7 MG/DL (ref 8.7–10.5)
CHLORIDE SERPL-SCNC: 104 MMOL/L (ref 95–110)
CO2 SERPL-SCNC: 22 MMOL/L (ref 23–29)
CREAT SERPL-MCNC: 0.8 MG/DL (ref 0.5–1.4)
DIFFERENTIAL METHOD: NORMAL
EOSINOPHIL # BLD AUTO: 0.2 K/UL (ref 0–0.5)
EOSINOPHIL NFR BLD: 2 % (ref 0–8)
ERYTHROCYTE [DISTWIDTH] IN BLOOD BY AUTOMATED COUNT: 13.2 % (ref 11.5–14.5)
EST. GFR  (NO RACE VARIABLE): >60 ML/MIN/1.73 M^2
ESTIMATED AVG GLUCOSE: 111 MG/DL (ref 68–131)
GLUCOSE SERPL-MCNC: 91 MG/DL (ref 70–110)
HBA1C MFR BLD: 5.5 % (ref 4–5.6)
HCT VFR BLD AUTO: 42.3 % (ref 40–54)
HGB BLD-MCNC: 14.4 G/DL (ref 14–18)
IMM GRANULOCYTES # BLD AUTO: 0.02 K/UL (ref 0–0.04)
IMM GRANULOCYTES NFR BLD AUTO: 0.2 % (ref 0–0.5)
LYMPHOCYTES # BLD AUTO: 3.2 K/UL (ref 1–4.8)
LYMPHOCYTES NFR BLD: 31.5 % (ref 18–48)
MCH RBC QN AUTO: 29.3 PG (ref 27–31)
MCHC RBC AUTO-ENTMCNC: 34 G/DL (ref 32–36)
MCV RBC AUTO: 86 FL (ref 82–98)
MONOCYTES # BLD AUTO: 0.8 K/UL (ref 0.3–1)
MONOCYTES NFR BLD: 7.6 % (ref 4–15)
NEUTROPHILS # BLD AUTO: 6 K/UL (ref 1.8–7.7)
NEUTROPHILS NFR BLD: 58.3 % (ref 38–73)
NRBC BLD-RTO: 0 /100 WBC
PLATELET # BLD AUTO: 271 K/UL (ref 150–450)
PMV BLD AUTO: 9.6 FL (ref 9.2–12.9)
POTASSIUM SERPL-SCNC: 4.2 MMOL/L (ref 3.5–5.1)
RBC # BLD AUTO: 4.92 M/UL (ref 4.6–6.2)
SODIUM SERPL-SCNC: 139 MMOL/L (ref 136–145)
TESTOST SERPL-MCNC: 275 NG/DL (ref 304–1227)
WBC # BLD AUTO: 10.19 K/UL (ref 3.9–12.7)

## 2022-09-07 PROCEDURE — 80048 BASIC METABOLIC PNL TOTAL CA: CPT | Performed by: STUDENT IN AN ORGANIZED HEALTH CARE EDUCATION/TRAINING PROGRAM

## 2022-09-07 PROCEDURE — 84403 ASSAY OF TOTAL TESTOSTERONE: CPT | Performed by: STUDENT IN AN ORGANIZED HEALTH CARE EDUCATION/TRAINING PROGRAM

## 2022-09-07 PROCEDURE — 85025 COMPLETE CBC W/AUTO DIFF WBC: CPT | Performed by: STUDENT IN AN ORGANIZED HEALTH CARE EDUCATION/TRAINING PROGRAM

## 2022-09-07 PROCEDURE — 83036 HEMOGLOBIN GLYCOSYLATED A1C: CPT | Performed by: STUDENT IN AN ORGANIZED HEALTH CARE EDUCATION/TRAINING PROGRAM

## 2022-09-07 PROCEDURE — 36415 COLL VENOUS BLD VENIPUNCTURE: CPT | Mod: PO | Performed by: STUDENT IN AN ORGANIZED HEALTH CARE EDUCATION/TRAINING PROGRAM

## 2022-09-08 DIAGNOSIS — E34.9 TESTOSTERONE DEFICIENCY: Primary | ICD-10-CM

## 2022-09-27 ENCOUNTER — LAB VISIT (OUTPATIENT)
Dept: LAB | Facility: HOSPITAL | Age: 37
End: 2022-09-27
Attending: STUDENT IN AN ORGANIZED HEALTH CARE EDUCATION/TRAINING PROGRAM
Payer: OTHER GOVERNMENT

## 2022-09-27 DIAGNOSIS — E34.9 TESTOSTERONE DEFICIENCY: ICD-10-CM

## 2022-09-27 LAB — TESTOST SERPL-MCNC: 274 NG/DL (ref 304–1227)

## 2022-09-27 PROCEDURE — 84403 ASSAY OF TOTAL TESTOSTERONE: CPT | Performed by: STUDENT IN AN ORGANIZED HEALTH CARE EDUCATION/TRAINING PROGRAM

## 2022-09-27 PROCEDURE — 36415 COLL VENOUS BLD VENIPUNCTURE: CPT | Mod: PO | Performed by: STUDENT IN AN ORGANIZED HEALTH CARE EDUCATION/TRAINING PROGRAM

## 2022-09-28 ENCOUNTER — TELEPHONE (OUTPATIENT)
Dept: FAMILY MEDICINE | Facility: CLINIC | Age: 37
End: 2022-09-28
Payer: OTHER GOVERNMENT

## 2022-09-28 DIAGNOSIS — E34.9 TESTOSTERONE DEFICIENCY: Primary | ICD-10-CM

## 2022-09-28 RX ORDER — TESTOSTERONE CYPIONATE 200 MG/ML
200 INJECTION, SOLUTION INTRAMUSCULAR
Status: DISCONTINUED | OUTPATIENT
Start: 2022-09-28 | End: 2022-10-14

## 2022-09-28 RX ORDER — TESTOSTERONE CYPIONATE 200 MG/ML
200 INJECTION, SOLUTION INTRAMUSCULAR
Status: DISCONTINUED | OUTPATIENT
Start: 2022-10-05 | End: 2022-10-14

## 2022-09-28 NOTE — TELEPHONE ENCOUNTER
----- Message from Kena Bourgeois MD sent at 9/28/2022  8:36 AM CDT -----  [facility]: labs and injection      I have reviewed the labs and recommend the following:     TESTOSTERONE-ABNORMAL-The TESTOSTERONE level is abnormal.    SCHEDULE the patient to receive 200 mg of DEPO-TESTOSTERONE by injection every 2 week(s) in [facility].     PEND an order for a repeat TESTOSTERONE level in 3 months and notify the patient of the date.  Schedule a visit with me after the labs are done.

## 2022-09-28 NOTE — TELEPHONE ENCOUNTER
I have signed for the following orders AND/OR meds.  Please call the patient and ask the patient to schedule the testing AND/OR inform about any medications that were sent.      Orders Placed This Encounter   Procedures    Testosterone     Standing Status:   Future     Standing Expiration Date:   11/27/2023

## 2022-10-04 ENCOUNTER — PATIENT MESSAGE (OUTPATIENT)
Dept: FAMILY MEDICINE | Facility: CLINIC | Age: 37
End: 2022-10-04
Payer: OTHER GOVERNMENT

## 2022-10-05 ENCOUNTER — TELEPHONE (OUTPATIENT)
Dept: UROLOGY | Facility: CLINIC | Age: 37
End: 2022-10-05
Payer: OTHER GOVERNMENT

## 2022-10-14 ENCOUNTER — OFFICE VISIT (OUTPATIENT)
Dept: UROLOGY | Facility: CLINIC | Age: 37
End: 2022-10-14
Payer: OTHER GOVERNMENT

## 2022-10-14 ENCOUNTER — LAB VISIT (OUTPATIENT)
Dept: LAB | Facility: HOSPITAL | Age: 37
End: 2022-10-14
Attending: UROLOGY
Payer: OTHER GOVERNMENT

## 2022-10-14 VITALS
SYSTOLIC BLOOD PRESSURE: 125 MMHG | WEIGHT: 299.38 LBS | BODY MASS INDEX: 42.96 KG/M2 | DIASTOLIC BLOOD PRESSURE: 80 MMHG

## 2022-10-14 DIAGNOSIS — E29.1 HYPOGONADISM IN MALE: ICD-10-CM

## 2022-10-14 DIAGNOSIS — E29.1 HYPOGONADISM IN MALE: Primary | ICD-10-CM

## 2022-10-14 LAB
COMPLEXED PSA SERPL-MCNC: 0.2 NG/ML (ref 0–4)
LH SERPL-ACNC: 2.1 MIU/ML (ref 0.6–12.1)
PROLACTIN SERPL IA-MCNC: 5.7 NG/ML (ref 3.5–19.4)

## 2022-10-14 PROCEDURE — 99213 OFFICE O/P EST LOW 20 MIN: CPT | Mod: PBBFAC,PN | Performed by: UROLOGY

## 2022-10-14 PROCEDURE — 99204 OFFICE O/P NEW MOD 45 MIN: CPT | Mod: S$PBB,,, | Performed by: UROLOGY

## 2022-10-14 PROCEDURE — 99999 PR PBB SHADOW E&M-EST. PATIENT-LVL III: ICD-10-PCS | Mod: PBBFAC,,, | Performed by: UROLOGY

## 2022-10-14 PROCEDURE — 84146 ASSAY OF PROLACTIN: CPT | Performed by: UROLOGY

## 2022-10-14 PROCEDURE — 83002 ASSAY OF GONADOTROPIN (LH): CPT | Performed by: UROLOGY

## 2022-10-14 PROCEDURE — 36415 COLL VENOUS BLD VENIPUNCTURE: CPT | Mod: PN | Performed by: UROLOGY

## 2022-10-14 PROCEDURE — 99999 PR PBB SHADOW E&M-EST. PATIENT-LVL III: CPT | Mod: PBBFAC,,, | Performed by: UROLOGY

## 2022-10-14 PROCEDURE — 99204 PR OFFICE/OUTPT VISIT, NEW, LEVL IV, 45-59 MIN: ICD-10-PCS | Mod: S$PBB,,, | Performed by: UROLOGY

## 2022-10-14 PROCEDURE — 84153 ASSAY OF PSA TOTAL: CPT | Performed by: UROLOGY

## 2022-10-14 RX ORDER — NEEDLES, DISPOSABLE 23GX1 1/2"
NEEDLE, DISPOSABLE MISCELLANEOUS
Qty: 12 EACH | Refills: 4 | Status: SHIPPED | OUTPATIENT
Start: 2022-10-14 | End: 2023-10-23

## 2022-10-14 RX ORDER — NEEDLES, DISPOSABLE 25GX5/8"
NEEDLE, DISPOSABLE MISCELLANEOUS
Qty: 12 EACH | Refills: 4 | Status: SHIPPED | OUTPATIENT
Start: 2022-10-14 | End: 2023-10-23

## 2022-10-14 RX ORDER — TESTOSTERONE CYPIONATE 200 MG/ML
200 INJECTION, SOLUTION INTRAMUSCULAR
Qty: 2 ML | Refills: 3 | Status: SHIPPED | OUTPATIENT
Start: 2022-10-14 | End: 2022-10-14

## 2022-10-14 RX ORDER — SYRINGE, DISPOSABLE, 3 ML
SYRINGE, EMPTY DISPOSABLE MISCELLANEOUS
Qty: 12 EACH | Refills: 4 | Status: SHIPPED | OUTPATIENT
Start: 2022-10-14 | End: 2023-10-25 | Stop reason: SDUPTHER

## 2022-10-14 RX ORDER — TESTOSTERONE CYPIONATE 200 MG/ML
200 INJECTION, SOLUTION INTRAMUSCULAR
Qty: 2 ML | Refills: 3 | Status: SHIPPED | OUTPATIENT
Start: 2022-10-14 | End: 2022-11-14 | Stop reason: SDUPTHER

## 2022-10-14 NOTE — PROGRESS NOTES
CC: low T    History of Present Illness:       10/14/22-36yo male here for evaluation of low T. He has had 2 levels drawn, both under 300. He reports fatigue and weight gain. Reports low libido. No stranguria or weak stream. No gross hematuria. Has 1 child and doesn't desire any more.       Review of Systems   Constitutional:  Positive for fatigue. Negative for chills and fever.   Respiratory:  Negative for shortness of breath.    Cardiovascular:  Negative for chest pain.   Gastrointestinal:  Negative for abdominal pain.   Genitourinary:  Negative for difficulty urinating.   Neurological:  Negative for weakness and light-headedness.   All other systems reviewed and are negative.      Past Medical History:   Diagnosis Date    ADHD (attention deficit hyperactivity disorder)     Allergy     Anxiety     Asthma     Depression     GERD (gastroesophageal reflux disease)        Past Surgical History:   Procedure Laterality Date    ANKLE FRACTURE SURGERY Left 2006    ANKLE SURGERY Left 2006    bone infection    APPENDECTOMY      CARPAL TUNNEL RELEASE      KNEE ASPIRATION Right 2005       Family History   Problem Relation Age of Onset    Prostate cancer Neg Hx        Social History     Tobacco Use    Smoking status: Every Day     Packs/day: 0.50     Years: 20.00     Pack years: 10.00     Types: Cigarettes    Smokeless tobacco: Never       Current Outpatient Medications   Medication Sig Dispense Refill    budesonide-formoterol 80-4.5 mcg (SYMBICORT) 80-4.5 mcg/actuation HFAA Inhale 2 puffs into the lungs. Controller      loratadine (CLARITIN) 10 mg tablet Take 10 mg by mouth once daily.      methylphenidate HCl (RITALIN) 10 MG tablet Take 1 tablet (10 mg total) by mouth 2 (two) times daily with meals. 60 tablet 0    [START ON 11/6/2022] methylphenidate HCl (RITALIN) 10 MG tablet Take 1 tablet (10 mg total) by mouth 2 (two) times daily with meals. 60 tablet 0    montelukast (SINGULAIR) 10 mg tablet Take 10 mg by mouth every  "evening.      omeprazole (PRILOSEC) 40 MG capsule Take 40 mg by mouth once daily.      venlafaxine (EFFEXOR) 75 MG tablet Take 75 mg by mouth once daily at 6am.      albuterol (ACCUNEB) 1.25 mg/3 mL Nebu Take 1.25 mg by nebulization every 6 (six) hours as needed. Rescue      metFORMIN (GLUCOPHAGE-XR) 500 MG ER 24hr tablet Take 1 tablet (500 mg total) by mouth every evening. 90 tablet 1    needle, disp, 18 G (HYPODERMIC NEEDLES) 18 gauge x 1 1/2" Ndle Use as directed 12 each 4    needle, disp, 23 gauge (EASY TOUCH HYPODERMIC NEEDLE) 23 gauge x 1 1/2" Ndle Use as directed 12 each 4    syringe, disposable, 3 mL Syrg Use as directed 12 each 4    testosterone cypionate (DEPOTESTOTERONE CYPIONATE) 200 mg/mL injection Inject 1 mL (200 mg total) into the muscle every 14 (fourteen) days. 2 mL 3     No current facility-administered medications for this visit.       Review of patient's allergies indicates:  No Known Allergies    Physical Exam  Vitals:    10/14/22 0915   BP: 125/80       General: Well-developed, well-nourished in no acute distress  HEENT: Normocephalic, atraumatic, Extraocular movements intact  Neck: supple, trachea midline, no cervical or supraclavicular lymphadenopathy  Respirations: even and unlabored  Back: midline spine, no CVA tenderness  :10/14/22-circumcised male phallus without lesions, orthotopic urethral meatus, no inguinal hernia, no inguinal lymphadenopathy, no scrotal lesions, testicles descended bilaterally without mass or tenderness  Extremities: atraumatic, moves all equally, no clubbing, cyanosis or edema  Psych: normal affect  Skin: warm and dry, no lesions  Neuro: Alert and oriented, Cranial nerves II-XII grossly intact    Urinalysis  pH, UA   Date Value Ref Range Status   09/07/2022 6.0 5.0 - 8.0 Final     Glucose, UA   Date Value Ref Range Status   09/07/2022 Negative Negative Final     Occult Blood UA   Date Value Ref Range Status   09/07/2022 Negative Negative Final     Nitrite, UA " "  Date Value Ref Range Status   09/07/2022 Negative Negative Final     Leukocytes, UA   Date Value Ref Range Status   09/07/2022 Negative Negative Final         Testosterone, Total   Date/Time Value Ref Range Status   09/27/2022 08:58  (L) 304 - 1227 ng/dL Final       Assessment:   1. Hypogonadism in male  Prostate Specific Antigen, Diagnostic    Prolactin    Luteinizing Hormone    Comprehensive Metabolic Panel    CBC Without Differential    Prostate Specific Antigen, Diagnostic    Testosterone          Plan:  Hypogonadism in male  -     Prostate Specific Antigen, Diagnostic; Future; Expected date: 10/14/2022  -     Prolactin; Future; Expected date: 10/14/2022  -     Luteinizing Hormone; Future; Expected date: 10/14/2022  -     Comprehensive Metabolic Panel; Future; Expected date: 01/13/2023  -     CBC Without Differential; Future; Expected date: 01/13/2023  -     Prostate Specific Antigen, Diagnostic; Future; Expected date: 01/13/2023  -     Testosterone; Future; Expected date: 01/13/2023    Other orders  -     Discontinue: testosterone cypionate (DEPOTESTOTERONE CYPIONATE) 200 mg/mL injection; Inject 1 mL (200 mg total) into the muscle every 14 (fourteen) days.  Dispense: 2 mL; Refill: 3  -     testosterone cypionate (DEPOTESTOTERONE CYPIONATE) 200 mg/mL injection; Inject 1 mL (200 mg total) into the muscle every 14 (fourteen) days.  Dispense: 2 mL; Refill: 3  -     syringe, disposable, 3 mL Syrg; Use as directed  Dispense: 12 each; Refill: 4  -     needle, disp, 18 G (HYPODERMIC NEEDLES) 18 gauge x 1 1/2" Ndle; Use as directed  Dispense: 12 each; Refill: 4  -     needle, disp, 23 gauge (EASY TOUCH HYPODERMIC NEEDLE) 23 gauge x 1 1/2" Ndle; Use as directed  Dispense: 12 each; Refill: 4    I had a detailed discussion with the patient, reviewing the diagnosis and management of testosterone deficiency. I explained that AUA guidelines recommend obtaining two early morning testosterone values less than 300 with " associated symptoms. We reviewed the management of low Testosterone, including exogenous T(injections, creams, gels, intranasal gel and oral), SERMs, aromatase inhibitors, and recombinant HCG. Reviewed that any exogenous T usage will result in drastically impaired fertility, and that this effect may be permanent. I reviewed that if the patient desires maintaining fertility while addressing his low T, exogenous T would not be an appropriate choice. Also explained that addressing his low T would require routine lab testing to ensure that his blood counts remain stable. We discussed the specific risks/benefits of each type of exogenous testosterone, including the risk of transference with topical treatments. We discussed the data as it relates to cardiovascular health and risk. We also discussed that while there is no evidence linking testosterone therapy to the development of prostate cancer, there could be risk in an individual with prostate cancer, as it relates to prostate cancer growth.     Follow up in about 3 months (around 1/14/2023) for labs before visit.

## 2022-10-17 ENCOUNTER — PATIENT MESSAGE (OUTPATIENT)
Dept: UROLOGY | Facility: CLINIC | Age: 37
End: 2022-10-17
Payer: OTHER GOVERNMENT

## 2022-10-17 DIAGNOSIS — R73.03 PREDIABETES: ICD-10-CM

## 2022-10-18 RX ORDER — METFORMIN HYDROCHLORIDE 500 MG/1
500 TABLET, EXTENDED RELEASE ORAL NIGHTLY
Qty: 90 TABLET | Refills: 1 | Status: SHIPPED | OUTPATIENT
Start: 2022-10-18 | End: 2023-04-03

## 2022-11-11 ENCOUNTER — LAB VISIT (OUTPATIENT)
Dept: LAB | Facility: HOSPITAL | Age: 37
End: 2022-11-11
Attending: STUDENT IN AN ORGANIZED HEALTH CARE EDUCATION/TRAINING PROGRAM
Payer: OTHER GOVERNMENT

## 2022-11-11 DIAGNOSIS — E34.9 TESTOSTERONE DEFICIENCY: ICD-10-CM

## 2022-11-11 DIAGNOSIS — R73.03 PREDIABETES: ICD-10-CM

## 2022-11-11 DIAGNOSIS — E78.00 HYPERCHOLESTEROLEMIA: ICD-10-CM

## 2022-11-11 PROCEDURE — 84403 ASSAY OF TOTAL TESTOSTERONE: CPT | Performed by: STUDENT IN AN ORGANIZED HEALTH CARE EDUCATION/TRAINING PROGRAM

## 2022-11-11 PROCEDURE — 36415 COLL VENOUS BLD VENIPUNCTURE: CPT | Mod: PO | Performed by: STUDENT IN AN ORGANIZED HEALTH CARE EDUCATION/TRAINING PROGRAM

## 2022-11-11 PROCEDURE — 83036 HEMOGLOBIN GLYCOSYLATED A1C: CPT | Performed by: STUDENT IN AN ORGANIZED HEALTH CARE EDUCATION/TRAINING PROGRAM

## 2022-11-11 PROCEDURE — 80061 LIPID PANEL: CPT | Performed by: STUDENT IN AN ORGANIZED HEALTH CARE EDUCATION/TRAINING PROGRAM

## 2022-11-12 LAB
CHOLEST SERPL-MCNC: 182 MG/DL (ref 120–199)
CHOLEST/HDLC SERPL: 6.7 {RATIO} (ref 2–5)
ESTIMATED AVG GLUCOSE: 111 MG/DL (ref 68–131)
HBA1C MFR BLD: 5.5 % (ref 4–5.6)
HDLC SERPL-MCNC: 27 MG/DL (ref 40–75)
HDLC SERPL: 14.8 % (ref 20–50)
LDLC SERPL CALC-MCNC: 116.8 MG/DL (ref 63–159)
NONHDLC SERPL-MCNC: 155 MG/DL
TESTOST SERPL-MCNC: 774 NG/DL (ref 304–1227)
TRIGL SERPL-MCNC: 191 MG/DL (ref 30–150)

## 2022-11-14 DIAGNOSIS — E34.9 TESTOSTERONE DEFICIENCY: Primary | ICD-10-CM

## 2022-11-14 DIAGNOSIS — R73.03 PREDIABETES: ICD-10-CM

## 2022-11-14 RX ORDER — TESTOSTERONE CYPIONATE 200 MG/ML
200 INJECTION, SOLUTION INTRAMUSCULAR
Qty: 2.5 ML | Refills: 3 | Status: SHIPPED | OUTPATIENT
Start: 2022-11-14 | End: 2023-01-20 | Stop reason: SDUPTHER

## 2022-11-14 NOTE — PROGRESS NOTES
Please schedule the following orders:  A1c and lipid in 6 months at [facility]   Testosterone panel: psa, cbc, testosterone in 3m at     I have sent a msg to patient with the following interpretation (see below):      Dear Mr.Brandon Barrios       I have reviewed your recent blood work:     A1c excellent! Continue medications as prescribed; limiting sugary drinks (juice, soda, sports drinks) and sweets; Also, ensure daily exercise. We will repeat in 6 months.     Testosterone is at goal. Lets decrease frequency of injections to once monthly. We will repeat levels in 3 months.    Triglycerides are high, cholesterol is normal. Please start daily fish oil/omega -3 fatty acids. We will repeat in 6 months.         Please do not hesitate to call or message with any additional questions or concerns.  Kena Bourgeois MD

## 2022-12-02 ENCOUNTER — OFFICE VISIT (OUTPATIENT)
Dept: FAMILY MEDICINE | Facility: CLINIC | Age: 37
End: 2022-12-02
Payer: OTHER GOVERNMENT

## 2022-12-02 VITALS
OXYGEN SATURATION: 97 % | HEART RATE: 61 BPM | TEMPERATURE: 97 F | RESPIRATION RATE: 18 BRPM | WEIGHT: 305.31 LBS | BODY MASS INDEX: 43.71 KG/M2 | HEIGHT: 70 IN | DIASTOLIC BLOOD PRESSURE: 81 MMHG | SYSTOLIC BLOOD PRESSURE: 128 MMHG

## 2022-12-02 DIAGNOSIS — E34.9 TESTOSTERONE DEFICIENCY: ICD-10-CM

## 2022-12-02 DIAGNOSIS — E66.01 CLASS 3 SEVERE OBESITY DUE TO EXCESS CALORIES WITH SERIOUS COMORBIDITY AND BODY MASS INDEX (BMI) OF 40.0 TO 44.9 IN ADULT: Primary | ICD-10-CM

## 2022-12-02 DIAGNOSIS — J45.20 MILD INTERMITTENT ASTHMA WITHOUT COMPLICATION: ICD-10-CM

## 2022-12-02 DIAGNOSIS — Z23 IMMUNIZATION DUE: ICD-10-CM

## 2022-12-02 DIAGNOSIS — F90.2 ATTENTION DEFICIT HYPERACTIVITY DISORDER (ADHD), COMBINED TYPE: ICD-10-CM

## 2022-12-02 PROCEDURE — 90471 IMMUNIZATION ADMIN: CPT | Mod: PBBFAC,PO

## 2022-12-02 PROCEDURE — 99214 OFFICE O/P EST MOD 30 MIN: CPT | Mod: S$PBB,,, | Performed by: STUDENT IN AN ORGANIZED HEALTH CARE EDUCATION/TRAINING PROGRAM

## 2022-12-02 PROCEDURE — 99214 PR OFFICE/OUTPT VISIT, EST, LEVL IV, 30-39 MIN: ICD-10-PCS | Mod: S$PBB,,, | Performed by: STUDENT IN AN ORGANIZED HEALTH CARE EDUCATION/TRAINING PROGRAM

## 2022-12-02 PROCEDURE — 99999 PR PBB SHADOW E&M-EST. PATIENT-LVL V: CPT | Mod: PBBFAC,,, | Performed by: STUDENT IN AN ORGANIZED HEALTH CARE EDUCATION/TRAINING PROGRAM

## 2022-12-02 PROCEDURE — 99999 PR PBB SHADOW E&M-EST. PATIENT-LVL V: ICD-10-PCS | Mod: PBBFAC,,, | Performed by: STUDENT IN AN ORGANIZED HEALTH CARE EDUCATION/TRAINING PROGRAM

## 2022-12-02 PROCEDURE — 99215 OFFICE O/P EST HI 40 MIN: CPT | Mod: PBBFAC,25,PO | Performed by: STUDENT IN AN ORGANIZED HEALTH CARE EDUCATION/TRAINING PROGRAM

## 2022-12-02 RX ORDER — LEVOCETIRIZINE DIHYDROCHLORIDE 5 MG/1
5 TABLET, FILM COATED ORAL NIGHTLY
Qty: 30 TABLET | Refills: 11 | Status: SHIPPED | OUTPATIENT
Start: 2022-12-02 | End: 2023-09-30

## 2022-12-02 NOTE — PROGRESS NOTES
Problem List Items Addressed This Visit          Psychiatric    Attention deficit hyperactivity disorder (ADHD), combined type    Overview     Chronic hx; doing well on methylphenidate 10 mg BID    Denies mood instability, irritability, aggression, palpitations, chest pain, weight loss, decreased appetite, disordered sleep  [facility] write              Pulmonary    Mild intermittent asthma    Overview     Well controlled with Symbicort, and singular; seldom use of albuterol         Relevant Medications    levocetirizine (XYZAL) 5 MG tablet       Endocrine    Class 3 severe obesity with body mass index (BMI) of 40.0 to 44.9 in adult - Primary    Overview     Wt Readings from Last 3 Encounters:   12/02/22 1018 (!) 138.5 kg (305 lb 5.4 oz)   10/14/22 0915 135.8 kg (299 lb 6.2 oz)   05/11/22 1359 (!) 139.3 kg (307 lb)   General weight loss/lifestyle modification strategies discussed: limit sugary drinks, exercise 3-5x per week  Informal exercise measures discussed, e.g. taking stairs instead of elevator.                 Testosterone deficiency    Overview     Testosterone therapy has improved fatigue   T levels improving, repeat labs in jan  Prn return           Other Visit Diagnoses       Immunization due        Relevant Orders    Influenza - Quadrivalent *Preferred* (6 months+) (PF) (Completed)                Follow up in about 6 months (around 6/2/2023) for in office.    Kena Bourgeois MD  _________________________________________________________________________      Patient ID: Elton Barrios is a 37 y.o. male.    CC: f/u   Fatigue and decreased libido improvement with Testosterone tx. Reports ritalin has been helping with focus. Reports he has been trying to eat better and be more active. of note, hx of shayy on cpap.     Quit smoking 2022    Denies fevers, chills, chest pain, SOB.      Past medical histories reviewed, including past medical, surgical, family and social histories.      Current Outpatient  "Medications on File Prior to Visit   Medication Sig Dispense Refill    albuterol (ACCUNEB) 1.25 mg/3 mL Nebu Take 1.25 mg by nebulization every 6 (six) hours as needed. Rescue      budesonide-formoterol 80-4.5 mcg (SYMBICORT) 80-4.5 mcg/actuation HFAA Inhale 2 puffs into the lungs. Controller      metFORMIN (GLUCOPHAGE-XR) 500 MG ER 24hr tablet Take 1 tablet (500 mg total) by mouth every evening. 90 tablet 1    methylphenidate HCl (RITALIN) 10 MG tablet Take 1 tablet (10 mg total) by mouth 2 (two) times daily with meals. 60 tablet 0    montelukast (SINGULAIR) 10 mg tablet Take 10 mg by mouth every evening.      needle, disp, 18 G (HYPODERMIC NEEDLES) 18 gauge x 1 1/2" Ndle Use as directed 12 each 4    needle, disp, 23 gauge (EASY TOUCH HYPODERMIC NEEDLE) 23 gauge x 1 1/2" Ndle Use as directed 12 each 4    omeprazole (PRILOSEC) 40 MG capsule Take 40 mg by mouth once daily.      syringe, disposable, 3 mL Syrg Use as directed 12 each 4    testosterone cypionate (DEPOTESTOTERONE CYPIONATE) 200 mg/mL injection Inject 1 mL (200 mg total) into the muscle every 28 days. 2.5 mL 3    venlafaxine (EFFEXOR) 75 MG tablet Take 75 mg by mouth once daily at 6am.      [DISCONTINUED] loratadine (CLARITIN) 10 mg tablet Take 10 mg by mouth once daily.       No current facility-administered medications on file prior to visit.       Review of Systems   12 point review of systems negative except for listed in HPI.     Objective:    Nursing note and vitals reviewed.  Vitals:    12/02/22 1018   BP: 128/81   Pulse: 61   Resp: 18   Temp: 97.2 °F (36.2 °C)     Body mass index is 43.81 kg/m².     Physical Exam   Constitutional: oriented to person, place, and time. well-developed and well-nourished. No distress.   HENT: WNL  Head: Normocephalic and atraumatic.   Eyes: EOM are normal.   Neck: Normal range of motion. Neck supple.   Cardiovascular: Normal rate  Pulmonary/Chest: Effort normal. No respiratory distress.   GI: soft, non distended, no " ttp, no rebound/guarding  Musculoskeletal: Normal range of motion. no edema.   Neurological: CN II-XII intact  Skin: warm and dry.   Psychiatric: normal mood and affect. behavior is normal.             We Offer Telehealth & Same Day Appointments!   Book your Telehealth appointment with me through my nurse or   Clinic appointments on [employer]!  [phone]     To Schedule appointments online, go to [employer]: [email or web address]

## 2023-01-13 ENCOUNTER — LAB VISIT (OUTPATIENT)
Dept: LAB | Facility: HOSPITAL | Age: 38
End: 2023-01-13
Attending: UROLOGY
Payer: OTHER GOVERNMENT

## 2023-01-13 DIAGNOSIS — E29.1 HYPOGONADISM IN MALE: ICD-10-CM

## 2023-01-13 PROCEDURE — 84403 ASSAY OF TOTAL TESTOSTERONE: CPT | Performed by: UROLOGY

## 2023-01-13 PROCEDURE — 85027 COMPLETE CBC AUTOMATED: CPT | Performed by: UROLOGY

## 2023-01-13 PROCEDURE — 80053 COMPREHEN METABOLIC PANEL: CPT | Performed by: UROLOGY

## 2023-01-13 PROCEDURE — 36415 COLL VENOUS BLD VENIPUNCTURE: CPT | Mod: PO | Performed by: UROLOGY

## 2023-01-13 PROCEDURE — 84153 ASSAY OF PSA TOTAL: CPT | Performed by: UROLOGY

## 2023-01-14 LAB
ALBUMIN SERPL BCP-MCNC: 4.2 G/DL (ref 3.5–5.2)
ALP SERPL-CCNC: 93 U/L (ref 55–135)
ALT SERPL W/O P-5'-P-CCNC: 39 U/L (ref 10–44)
ANION GAP SERPL CALC-SCNC: 9 MMOL/L (ref 8–16)
AST SERPL-CCNC: 27 U/L (ref 10–40)
BILIRUB SERPL-MCNC: 0.6 MG/DL (ref 0.1–1)
BUN SERPL-MCNC: 10 MG/DL (ref 6–20)
CALCIUM SERPL-MCNC: 9.6 MG/DL (ref 8.7–10.5)
CHLORIDE SERPL-SCNC: 102 MMOL/L (ref 95–110)
CO2 SERPL-SCNC: 26 MMOL/L (ref 23–29)
COMPLEXED PSA SERPL-MCNC: 0.19 NG/ML (ref 0–4)
CREAT SERPL-MCNC: 0.9 MG/DL (ref 0.5–1.4)
ERYTHROCYTE [DISTWIDTH] IN BLOOD BY AUTOMATED COUNT: 13.2 % (ref 11.5–14.5)
EST. GFR  (NO RACE VARIABLE): >60 ML/MIN/1.73 M^2
GLUCOSE SERPL-MCNC: 96 MG/DL (ref 70–110)
HCT VFR BLD AUTO: 48 % (ref 40–54)
HGB BLD-MCNC: 15.6 G/DL (ref 14–18)
MCH RBC QN AUTO: 28.3 PG (ref 27–31)
MCHC RBC AUTO-ENTMCNC: 32.5 G/DL (ref 32–36)
MCV RBC AUTO: 87 FL (ref 82–98)
PLATELET # BLD AUTO: 259 K/UL (ref 150–450)
PMV BLD AUTO: 10.2 FL (ref 9.2–12.9)
POTASSIUM SERPL-SCNC: 4.3 MMOL/L (ref 3.5–5.1)
PROT SERPL-MCNC: 7.4 G/DL (ref 6–8.4)
RBC # BLD AUTO: 5.52 M/UL (ref 4.6–6.2)
SODIUM SERPL-SCNC: 137 MMOL/L (ref 136–145)
TESTOST SERPL-MCNC: 256 NG/DL (ref 304–1227)
WBC # BLD AUTO: 11.27 K/UL (ref 3.9–12.7)

## 2023-01-20 ENCOUNTER — OFFICE VISIT (OUTPATIENT)
Dept: UROLOGY | Facility: CLINIC | Age: 38
End: 2023-01-20
Payer: OTHER GOVERNMENT

## 2023-01-20 VITALS
DIASTOLIC BLOOD PRESSURE: 88 MMHG | SYSTOLIC BLOOD PRESSURE: 150 MMHG | HEART RATE: 76 BPM | WEIGHT: 310.44 LBS | BODY MASS INDEX: 44.44 KG/M2 | HEIGHT: 70 IN

## 2023-01-20 DIAGNOSIS — E34.9 TESTOSTERONE DEFICIENCY: ICD-10-CM

## 2023-01-20 DIAGNOSIS — E66.01 CLASS 3 SEVERE OBESITY DUE TO EXCESS CALORIES WITH SERIOUS COMORBIDITY AND BODY MASS INDEX (BMI) OF 40.0 TO 44.9 IN ADULT: Primary | ICD-10-CM

## 2023-01-20 PROCEDURE — 99999 PR PBB SHADOW E&M-EST. PATIENT-LVL III: CPT | Mod: PBBFAC,,, | Performed by: UROLOGY

## 2023-01-20 PROCEDURE — 99999 PR PBB SHADOW E&M-EST. PATIENT-LVL III: ICD-10-PCS | Mod: PBBFAC,,, | Performed by: UROLOGY

## 2023-01-20 PROCEDURE — 99214 PR OFFICE/OUTPT VISIT, EST, LEVL IV, 30-39 MIN: ICD-10-PCS | Mod: S$PBB,,, | Performed by: UROLOGY

## 2023-01-20 PROCEDURE — 99214 OFFICE O/P EST MOD 30 MIN: CPT | Mod: S$PBB,,, | Performed by: UROLOGY

## 2023-01-20 PROCEDURE — 99213 OFFICE O/P EST LOW 20 MIN: CPT | Mod: PBBFAC,PN | Performed by: UROLOGY

## 2023-01-20 RX ORDER — TESTOSTERONE CYPIONATE 200 MG/ML
200 INJECTION, SOLUTION INTRAMUSCULAR
Qty: 2 ML | Refills: 3 | Status: SHIPPED | OUTPATIENT
Start: 2023-01-20 | End: 2023-02-08 | Stop reason: SDUPTHER

## 2023-01-20 NOTE — PROGRESS NOTES
CC: low T    History of Present Illness:     1/20/23-Pt is currently on T cypionate 200mg IM Q28 day, which was switched by his PCP. Level recently was low at 256 at the end of the 4 weeks. When it was switched to 4 weeks, he was having low energy again. Sleep is good. Libido is good on T. No bothersome LUTS.  Had labs 1 month after starting T and had a level of 774 mid-cycle.     10/14/22-38yo male here for evaluation of low T. He has had 2 levels drawn, both under 300. He reports fatigue and weight gain. Reports low libido. No stranguria or weak stream. No gross hematuria. Has 1 child and doesn't desire any more.       Review of Systems   Constitutional:  Negative for chills and fever.   Respiratory:  Negative for shortness of breath.    Cardiovascular:  Negative for chest pain.   Genitourinary:  Negative for difficulty urinating.   All other systems reviewed and are negative.      Past Medical History:   Diagnosis Date    ADHD (attention deficit hyperactivity disorder)     Allergy     Anxiety     Asthma     Depression     GERD (gastroesophageal reflux disease)        Past Surgical History:   Procedure Laterality Date    ANKLE FRACTURE SURGERY Left 2006    ANKLE SURGERY Left 2006    bone infection    APPENDECTOMY      CARPAL TUNNEL RELEASE      KNEE ASPIRATION Right 2005       Family History   Problem Relation Age of Onset    Prostate cancer Neg Hx        Social History     Tobacco Use    Smoking status: Former     Packs/day: 0.50     Years: 20.00     Pack years: 10.00     Types: Cigarettes    Smokeless tobacco: Never       Current Outpatient Medications   Medication Sig Dispense Refill    albuterol (ACCUNEB) 1.25 mg/3 mL Nebu Take 1.25 mg by nebulization every 6 (six) hours as needed. Rescue      budesonide-formoterol 80-4.5 mcg (SYMBICORT) 80-4.5 mcg/actuation HFAA Inhale 2 puffs into the lungs. Controller      levocetirizine (XYZAL) 5 MG tablet Take 1 tablet (5 mg total) by mouth every evening. 30 tablet 11  "   metFORMIN (GLUCOPHAGE-XR) 500 MG ER 24hr tablet Take 1 tablet (500 mg total) by mouth every evening. 90 tablet 1    montelukast (SINGULAIR) 10 mg tablet Take 10 mg by mouth every evening.      needle, disp, 18 G (HYPODERMIC NEEDLES) 18 gauge x 1 1/2" Ndle Use as directed 12 each 4    needle, disp, 23 gauge (EASY TOUCH HYPODERMIC NEEDLE) 23 gauge x 1 1/2" Ndle Use as directed 12 each 4    omeprazole (PRILOSEC) 40 MG capsule Take 40 mg by mouth once daily.      syringe, disposable, 3 mL Syrg Use as directed 12 each 4    venlafaxine (EFFEXOR) 75 MG tablet Take 75 mg by mouth once daily at 6am.      methylphenidate HCl (RITALIN) 10 MG tablet Take 1 tablet (10 mg total) by mouth 2 (two) times daily with meals. 60 tablet 0    testosterone cypionate (DEPOTESTOTERONE CYPIONATE) 200 mg/mL injection Inject 1 mL (200 mg total) into the muscle every 14 (fourteen) days. 2 mL 3     No current facility-administered medications for this visit.       Review of patient's allergies indicates:  No Known Allergies    Physical Exam  Vitals:    01/20/23 1039   BP: (!) 150/88   Pulse: 76       General: Well-developed, well-nourished in no acute distress  HEENT: Normocephalic, atraumatic, Extraocular movements intact  Neck: supple, trachea midline, no cervical or supraclavicular lymphadenopathy  Respirations: even and unlabored  Back: midline spine, no CVA tenderness  :10/14/22-circumcised male phallus without lesions, orthotopic urethral meatus, no inguinal hernia, no inguinal lymphadenopathy, no scrotal lesions, testicles descended bilaterally without mass or tenderness  Extremities: atraumatic, moves all equally, no clubbing, cyanosis or edema  Psych: normal affect  Skin: warm and dry, no lesions  Neuro: Alert and oriented, Cranial nerves II-XII grossly intact    Urinalysis  pH, UA   Date Value Ref Range Status   09/07/2022 6.0 5.0 - 8.0 Final     Glucose, UA   Date Value Ref Range Status   09/07/2022 Negative Negative Final "     Occult Blood UA   Date Value Ref Range Status   09/07/2022 Negative Negative Final     Nitrite, UA   Date Value Ref Range Status   09/07/2022 Negative Negative Final     Leukocytes, UA   Date Value Ref Range Status   09/07/2022 Negative Negative Final         Testosterone, Total   Date/Time Value Ref Range Status   01/13/2023 11:35  (L) 304 - 1227 ng/dL Final       Assessment:   1. Class 3 severe obesity due to excess calories with serious comorbidity and body mass index (BMI) of 40.0 to 44.9 in adult        2. Testosterone deficiency  testosterone cypionate (DEPOTESTOTERONE CYPIONATE) 200 mg/mL injection    CBC Without Differential    Comprehensive Metabolic Panel    Prostate Specific Antigen, Diagnostic    Testosterone            Plan:  Class 3 severe obesity due to excess calories with serious comorbidity and body mass index (BMI) of 40.0 to 44.9 in adult    Testosterone deficiency  -     testosterone cypionate (DEPOTESTOTERONE CYPIONATE) 200 mg/mL injection; Inject 1 mL (200 mg total) into the muscle every 14 (fourteen) days.  Dispense: 2 mL; Refill: 3  -     CBC Without Differential; Future; Expected date: 04/06/2023  -     Comprehensive Metabolic Panel; Future; Expected date: 04/06/2023  -     Prostate Specific Antigen, Diagnostic; Future; Expected date: 04/06/2023  -     Testosterone; Future; Expected date: 04/06/2023      Follow up in about 3 months (around 4/20/2023) for labs before visit.

## 2023-03-31 ENCOUNTER — LAB VISIT (OUTPATIENT)
Dept: LAB | Facility: HOSPITAL | Age: 38
End: 2023-03-31
Attending: UROLOGY
Payer: OTHER GOVERNMENT

## 2023-03-31 DIAGNOSIS — E34.9 TESTOSTERONE DEFICIENCY: ICD-10-CM

## 2023-03-31 LAB
ALBUMIN SERPL BCP-MCNC: 4.1 G/DL (ref 3.5–5.2)
ALP SERPL-CCNC: 85 U/L (ref 55–135)
ALT SERPL W/O P-5'-P-CCNC: 44 U/L (ref 10–44)
ANION GAP SERPL CALC-SCNC: 9 MMOL/L (ref 8–16)
AST SERPL-CCNC: 36 U/L (ref 10–40)
BILIRUB SERPL-MCNC: 0.4 MG/DL (ref 0.1–1)
BUN SERPL-MCNC: 13 MG/DL (ref 6–20)
CALCIUM SERPL-MCNC: 9.3 MG/DL (ref 8.7–10.5)
CHLORIDE SERPL-SCNC: 108 MMOL/L (ref 95–110)
CO2 SERPL-SCNC: 24 MMOL/L (ref 23–29)
COMPLEXED PSA SERPL-MCNC: 0.28 NG/ML (ref 0–4)
CREAT SERPL-MCNC: 0.8 MG/DL (ref 0.5–1.4)
ERYTHROCYTE [DISTWIDTH] IN BLOOD BY AUTOMATED COUNT: 13.2 % (ref 11.5–14.5)
EST. GFR  (NO RACE VARIABLE): >60 ML/MIN/1.73 M^2
GLUCOSE SERPL-MCNC: 101 MG/DL (ref 70–110)
HCT VFR BLD AUTO: 45.6 % (ref 40–54)
HGB BLD-MCNC: 15 G/DL (ref 14–18)
MCH RBC QN AUTO: 28.1 PG (ref 27–31)
MCHC RBC AUTO-ENTMCNC: 32.9 G/DL (ref 32–36)
MCV RBC AUTO: 85 FL (ref 82–98)
PLATELET # BLD AUTO: 252 K/UL (ref 150–450)
PMV BLD AUTO: 10.2 FL (ref 9.2–12.9)
POTASSIUM SERPL-SCNC: 4.2 MMOL/L (ref 3.5–5.1)
PROT SERPL-MCNC: 7.1 G/DL (ref 6–8.4)
RBC # BLD AUTO: 5.34 M/UL (ref 4.6–6.2)
SODIUM SERPL-SCNC: 141 MMOL/L (ref 136–145)
TESTOST SERPL-MCNC: 693 NG/DL (ref 304–1227)
WBC # BLD AUTO: 11.77 K/UL (ref 3.9–12.7)

## 2023-03-31 PROCEDURE — 36415 COLL VENOUS BLD VENIPUNCTURE: CPT | Mod: PN | Performed by: UROLOGY

## 2023-03-31 PROCEDURE — 84153 ASSAY OF PSA TOTAL: CPT | Performed by: UROLOGY

## 2023-03-31 PROCEDURE — 80053 COMPREHEN METABOLIC PANEL: CPT | Performed by: UROLOGY

## 2023-03-31 PROCEDURE — 84403 ASSAY OF TOTAL TESTOSTERONE: CPT | Performed by: UROLOGY

## 2023-03-31 PROCEDURE — 85027 COMPLETE CBC AUTOMATED: CPT | Performed by: UROLOGY

## 2023-04-03 ENCOUNTER — PATIENT MESSAGE (OUTPATIENT)
Dept: FAMILY MEDICINE | Facility: CLINIC | Age: 38
End: 2023-04-03

## 2023-04-03 ENCOUNTER — OFFICE VISIT (OUTPATIENT)
Dept: FAMILY MEDICINE | Facility: CLINIC | Age: 38
End: 2023-04-03
Payer: OTHER GOVERNMENT

## 2023-04-03 DIAGNOSIS — E66.01 CLASS 3 SEVERE OBESITY DUE TO EXCESS CALORIES WITH SERIOUS COMORBIDITY AND BODY MASS INDEX (BMI) OF 40.0 TO 44.9 IN ADULT: ICD-10-CM

## 2023-04-03 DIAGNOSIS — R73.03 PREDIABETES: Primary | ICD-10-CM

## 2023-04-03 DIAGNOSIS — F43.10 PTSD (POST-TRAUMATIC STRESS DISORDER): ICD-10-CM

## 2023-04-03 PROCEDURE — 99213 OFFICE O/P EST LOW 20 MIN: CPT | Mod: 95,,, | Performed by: STUDENT IN AN ORGANIZED HEALTH CARE EDUCATION/TRAINING PROGRAM

## 2023-04-03 PROCEDURE — 99213 PR OFFICE/OUTPT VISIT, EST, LEVL III, 20-29 MIN: ICD-10-PCS | Mod: 95,,, | Performed by: STUDENT IN AN ORGANIZED HEALTH CARE EDUCATION/TRAINING PROGRAM

## 2023-04-03 RX ORDER — DULAGLUTIDE 0.75 MG/.5ML
0.75 INJECTION, SOLUTION SUBCUTANEOUS
Qty: 4 PEN | Refills: 11 | Status: SHIPPED | OUTPATIENT
Start: 2023-04-03 | End: 2023-04-05 | Stop reason: SDUPTHER

## 2023-05-08 ENCOUNTER — OFFICE VISIT (OUTPATIENT)
Dept: URGENT CARE | Facility: CLINIC | Age: 38
End: 2023-05-08
Payer: OTHER GOVERNMENT

## 2023-05-08 VITALS
RESPIRATION RATE: 16 BRPM | WEIGHT: 310 LBS | SYSTOLIC BLOOD PRESSURE: 140 MMHG | HEIGHT: 70 IN | DIASTOLIC BLOOD PRESSURE: 85 MMHG | OXYGEN SATURATION: 95 % | HEART RATE: 88 BPM | TEMPERATURE: 99 F | BODY MASS INDEX: 44.38 KG/M2

## 2023-05-08 DIAGNOSIS — J06.9 URI WITH COUGH AND CONGESTION: Primary | ICD-10-CM

## 2023-05-08 DIAGNOSIS — R03.0 ELEVATED BLOOD PRESSURE READING IN OFFICE WITHOUT DIAGNOSIS OF HYPERTENSION: ICD-10-CM

## 2023-05-08 LAB
CTP QC/QA: YES
SARS-COV-2 AG RESP QL IA.RAPID: NEGATIVE

## 2023-05-08 PROCEDURE — 99213 PR OFFICE/OUTPT VISIT, EST, LEVL III, 20-29 MIN: ICD-10-PCS | Mod: S$GLB,,, | Performed by: PHYSICIAN ASSISTANT

## 2023-05-08 PROCEDURE — 87811 SARS-COV-2 COVID19 W/OPTIC: CPT | Mod: QW,S$GLB,, | Performed by: PHYSICIAN ASSISTANT

## 2023-05-08 PROCEDURE — 87811 SARS CORONAVIRUS 2 ANTIGEN POCT, MANUAL READ: ICD-10-PCS | Mod: QW,S$GLB,, | Performed by: PHYSICIAN ASSISTANT

## 2023-05-08 PROCEDURE — 99213 OFFICE O/P EST LOW 20 MIN: CPT | Mod: S$GLB,,, | Performed by: PHYSICIAN ASSISTANT

## 2023-05-08 NOTE — PROGRESS NOTES
"Subjective:      Patient ID: Elton Barrios is a 38 y.o. male.    Vitals:  height is 5' 10" (1.778 m) and weight is 140.6 kg (310 lb) (abnormal). His tympanic temperature is 99 °F (37.2 °C). His blood pressure is 140/85 (abnormal) and his pulse is 88. His respiration is 16 and oxygen saturation is 95%.     Chief Complaint: Sore Throat    Patient presents today with sore throat, some congestion, fatigue, dizzy, and productive cough. [place] in the last [place]. Patient states this all started Saturday evening and he slept all day Sunday. No fever, CP, or SOB.    BP noted to be a little elevated. No hx of HTN. Has not been taking decongestants     Sore Throat   This is a new problem. The current episode started in the past 7 days (5/6). The problem has been gradually worsening. Neither side of throat is experiencing more pain than the other. There has been no fever. The pain is at a severity of 5/10. The pain is moderate. Associated symptoms include congestion, coughing, headaches and trouble swallowing. Pertinent negatives include no abdominal pain, diarrhea, ear discharge, ear pain, plugged ear sensation, shortness of breath or vomiting. Associated symptoms comments: Body aches, sweats. Chills, dizzy, fatigue. He has had no exposure to strep or mono. Treatments tried: rest. The treatment provided no relief.     Constitution: Positive for chills, sweating and fatigue.   HENT:  Positive for congestion, sore throat and trouble swallowing. Negative for ear pain and ear discharge.    Eyes:  Negative for eye discharge, eye itching and eye pain.   Respiratory:  Positive for cough and sputum production. Negative for shortness of breath and wheezing.    Gastrointestinal:  Negative for abdominal pain, vomiting and diarrhea.   Musculoskeletal:  Positive for muscle ache.   Neurological:  Positive for headaches.    Objective:     Physical Exam   Constitutional: He is oriented to person, " place, and time. He appears well-developed. He is cooperative.  Non-toxic appearance. He does not appear ill. No distress. obesity  HENT:   Head: Normocephalic and atraumatic.   Ears:   Right Ear: Hearing, tympanic membrane, external ear and ear canal normal.   Left Ear: Hearing, tympanic membrane, external ear and ear canal normal.   Nose: Mucosal edema and congestion present. No rhinorrhea or nasal deformity. No epistaxis. Right sinus exhibits no maxillary sinus tenderness and no frontal sinus tenderness. Left sinus exhibits no maxillary sinus tenderness and no frontal sinus tenderness.   Mouth/Throat: Uvula is midline and mucous membranes are normal. No trismus in the jaw. Normal dentition. No uvula swelling. Posterior oropharyngeal erythema and cobblestoning present. No oropharyngeal exudate or posterior oropharyngeal edema. Tonsils are 0 on the left. No tonsillar exudate.   Eyes: Conjunctivae and lids are normal. No scleral icterus.   Neck: Trachea normal and phonation normal. Neck supple. No edema present. No erythema present. No neck rigidity present.   Cardiovascular: Normal rate, regular rhythm, normal heart sounds and normal pulses.   Pulmonary/Chest: Effort normal and breath sounds normal. No respiratory distress. He has no decreased breath sounds. He has no rhonchi.   Abdominal: Normal appearance.   Musculoskeletal: Normal range of motion.         General: No deformity. Normal range of motion.   Neurological: He is alert and oriented to person, place, and time. He exhibits normal muscle tone. Coordination normal.   Skin: Skin is warm, dry, intact, not diaphoretic and not pale.   Psychiatric: His speech is normal and behavior is normal. Judgment and thought content normal.   Nursing note and vitals reviewed.    Results for orders placed or performed in visit on 05/08/23   SARS Coronavirus 2 Antigen, POCT Manual Read   Result Value Ref Range    SARS Coronavirus 2 Antigen Negative Negative    Quality  Control Acceptable Yes        Assessment:     1. URI with cough and congestion    2. Elevated blood pressure reading in office without diagnosis of hypertension        Plan:       URI with cough and congestion  -     SARS Coronavirus 2 Antigen, POCT Manual Read    Elevated blood pressure reading in office without diagnosis of hypertension    Caroline Fusilier PA-C [facility]       Patient Instructions   MUCINEX DM for cough and congestion. Sterile saline rinses and flonase to decongest. Tylenol or motrin for pain/fever. Rest and drink plenty of fluids.     Follow up with your doctor for any persistent new fever, or persistent sinus pressure/congestion not improving > 10 days.You need to be seen urgently if you develop any chest pain or shortness of breath.     Your blood pressure is slightly elevated today. It was also elevated at your recent urologist appointment. Please avoid any decongestants (pseudoephedrine and phenylephrine). Blood pressure diary daily for the next 2 weeks. If persistently > 140/80, please follow up with your doctor.

## 2023-05-11 ENCOUNTER — TELEPHONE (OUTPATIENT)
Dept: URGENT CARE | Facility: CLINIC | Age: 38
End: 2023-05-11
Payer: OTHER GOVERNMENT

## 2023-05-11 NOTE — TELEPHONE ENCOUNTER
Made courtesy call to pt,pt states he is seeing improvement of his symptoms at the time of the call

## 2023-05-29 ENCOUNTER — OFFICE VISIT (OUTPATIENT)
Dept: FAMILY MEDICINE | Facility: CLINIC | Age: 38
End: 2023-05-29
Payer: OTHER GOVERNMENT

## 2023-05-29 VITALS — SYSTOLIC BLOOD PRESSURE: 120 MMHG | WEIGHT: 305 LBS | BODY MASS INDEX: 43.76 KG/M2 | DIASTOLIC BLOOD PRESSURE: 80 MMHG

## 2023-05-29 DIAGNOSIS — R73.03 PREDIABETES: ICD-10-CM

## 2023-05-29 DIAGNOSIS — E66.01 CLASS 3 SEVERE OBESITY DUE TO EXCESS CALORIES WITH SERIOUS COMORBIDITY AND BODY MASS INDEX (BMI) OF 40.0 TO 44.9 IN ADULT: ICD-10-CM

## 2023-05-29 PROCEDURE — 99213 PR OFFICE/OUTPT VISIT, EST, LEVL III, 20-29 MIN: ICD-10-PCS | Mod: 95,,, | Performed by: STUDENT IN AN ORGANIZED HEALTH CARE EDUCATION/TRAINING PROGRAM

## 2023-05-29 PROCEDURE — 99213 OFFICE O/P EST LOW 20 MIN: CPT | Mod: 95,,, | Performed by: STUDENT IN AN ORGANIZED HEALTH CARE EDUCATION/TRAINING PROGRAM

## 2023-05-29 NOTE — PROGRESS NOTES
Problem List Items Addressed This Visit          Endocrine    Class 3 severe obesity with body mass index (BMI) of 40.0 to 44.9 in adult    Overview     Wt Readings from Last 3 Encounters:   05/29/23 1604 (!) 138.3 kg (305 lb)   05/08/23 1825 (!) 140.6 kg (310 lb)   01/20/23 1039 (!) 140.8 kg (310 lb 6.5 oz)   Patient denies family history and personal history of thyroid cancer, pancreatic cancer or pancreatitis.    Diabetes Medications               dulaglutide (TRULICITY) 1.5 mg/0.5 mL pen injector Inject 1.5 mg into the skin once a week.        General weight loss/lifestyle modification strategies discussed: limit sugary drinks, exercise 3-5x per week  Informal exercise measures discussed, e.g. taking stairs instead of elevator.                         Relevant Medications    dulaglutide (TRULICITY) 1.5 mg/0.5 mL pen injector    Prediabetes    Overview     Lab Results   Component Value Date    HGBA1C 5.5 11/11/2022     Diabetes Medications               dulaglutide (TRULICITY) 1.5 mg/0.5 mL pen injector Inject 1.5 mg into the skin once a week.         -discussed the importance of limiting sugary drinks (sodas, juices, sweet teas) and participating in regular daily exercise 30 min 3-5 days weekly.   3-6 m follow up             Relevant Medications    dulaglutide (TRULICITY) 1.5 mg/0.5 mL pen injector               Follow up in about 6 months (around 11/29/2023).    Kena Bourgeois MD  _________________________________________________________________________      Patient ID: Elton Barrios is a 38 y.o. male.    CC: f/u     Tolerating trulicity well. Would like to increase to 1.5 mg weekly. Otherwise, patient has been feeling well. No additional concerns.     Quit smoking 2022    Denies fevers, chills, chest pain, SOB.    Reports routine exercise.     Past medical histories reviewed, including past medical, surgical, family and social histories.      Current Outpatient Medications on File Prior to Visit  "  Medication Sig Dispense Refill    albuterol (ACCUNEB) 1.25 mg/3 mL Nebu Take 1.25 mg by nebulization every 6 (six) hours as needed. Rescue      budesonide-formoterol 80-4.5 mcg (SYMBICORT) 80-4.5 mcg/actuation HFAA Inhale 2 puffs into the lungs. Controller      levocetirizine (XYZAL) 5 MG tablet Take 1 tablet (5 mg total) by mouth every evening. 30 tablet 11    methylphenidate HCl (RITALIN) 10 MG tablet Take 1 tablet (10 mg total) by mouth 2 (two) times daily with meals. 60 tablet 0    montelukast (SINGULAIR) 10 mg tablet Take 10 mg by mouth every evening.      needle, disp, 18 G (HYPODERMIC NEEDLES) 18 gauge x 1 1/2" Ndle Use as directed 12 each 4    needle, disp, 23 gauge (EASY TOUCH HYPODERMIC NEEDLE) 23 gauge x 1 1/2" Ndle Use as directed 12 each 4    omeprazole (PRILOSEC) 40 MG capsule Take 40 mg by mouth once daily.      syringe, disposable, 3 mL Syrg Use as directed 12 each 4    testosterone cypionate (DEPOTESTOTERONE CYPIONATE) 200 mg/mL injection Inject 1 mL (200 mg total) into the muscle every 14 (fourteen) days. 2 mL 3    venlafaxine (EFFEXOR) 75 MG tablet Take 75 mg by mouth once daily at 6am.      [DISCONTINUED] dulaglutide (TRULICITY) 0.75 mg/0.5 mL pen injector Inject 0.75 mg into the skin every 7 days. 4 pen 11     No current facility-administered medications on file prior to visit.       Review of Systems   12 point review of systems negative except for listed in HPI.     Objective:    Nursing note and vitals reviewed.  Vitals:    05/29/23 1604   BP: 120/80       Body mass index is 43.76 kg/m².   No vitals or full physical exam obtained as this is a virtual visit  Gen: no distress, comfortable            We Offer Telehealth & Same Day Appointments!   Book your Telehealth appointment with me through my nurse or   Clinic appointments on "[employer]!  [phone]     To Schedule appointments online, go to "[employer]: [email or web address]         Answers submitted by the " patient for this visit:  Review of Systems Questionnaire (Submitted on 5/29/2023)  activity change: No  unexpected weight change: No  neck pain: No  hearing loss: No  rhinorrhea: No  trouble swallowing: No  eye discharge: No  visual disturbance: No  chest tightness: No  wheezing: No  chest pain: No  palpitations: No  blood in stool: No  constipation: No  vomiting: No  diarrhea: No  polydipsia: No  polyuria: No  difficulty urinating: No  urgency: No  hematuria: No  joint swelling: No  arthralgias: No  headaches: No  weakness: No  confusion: No  dysphoric mood: No      The patient location is: [place]  The chief complaint leading to consultation is: weight    Visit type: audiovisual    Time with patient: 5 minutes   15 minutes of total time spent on the encounter, which includes face to face time and non-face to face time preparing to see the patient (eg, review of tests), Obtaining and/or reviewing separately obtained history, Documenting clinical information in the electronic or other health record, Independently interpreting results (not separately reported) and communicating results to the patient/family/caregiver, or Care coordination (not separately reported).         Each patient to whom he or she provides medical services by telemedicine is:  (1) informed of the relationship between the physician and patient and the respective role of any other health care provider with respect to management of the patient; and (2) notified that he or she may decline to receive medical services by telemedicine and may withdraw from such care at any time.

## 2023-08-21 ENCOUNTER — PATIENT MESSAGE (OUTPATIENT)
Dept: FAMILY MEDICINE | Facility: CLINIC | Age: 38
End: 2023-08-21
Payer: OTHER GOVERNMENT

## 2023-08-23 ENCOUNTER — OFFICE VISIT (OUTPATIENT)
Dept: FAMILY MEDICINE | Facility: CLINIC | Age: 38
End: 2023-08-23
Payer: OTHER GOVERNMENT

## 2023-08-23 VITALS — BODY MASS INDEX: 44.48 KG/M2 | WEIGHT: 310 LBS

## 2023-08-23 DIAGNOSIS — E88.810 METABOLIC SYNDROME: ICD-10-CM

## 2023-08-23 DIAGNOSIS — R73.03 PREDIABETES: ICD-10-CM

## 2023-08-23 DIAGNOSIS — E66.01 CLASS 3 SEVERE OBESITY DUE TO EXCESS CALORIES WITH SERIOUS COMORBIDITY AND BODY MASS INDEX (BMI) OF 40.0 TO 44.9 IN ADULT: Primary | ICD-10-CM

## 2023-08-23 PROCEDURE — 99213 PR OFFICE/OUTPT VISIT, EST, LEVL III, 20-29 MIN: ICD-10-PCS | Mod: 95,,, | Performed by: STUDENT IN AN ORGANIZED HEALTH CARE EDUCATION/TRAINING PROGRAM

## 2023-08-23 PROCEDURE — 99213 OFFICE O/P EST LOW 20 MIN: CPT | Mod: 95,,, | Performed by: STUDENT IN AN ORGANIZED HEALTH CARE EDUCATION/TRAINING PROGRAM

## 2023-08-23 RX ORDER — VENLAFAXINE HYDROCHLORIDE 150 MG/1
150 CAPSULE, EXTENDED RELEASE ORAL DAILY
COMMUNITY

## 2023-08-23 RX ORDER — SEMAGLUTIDE 1.34 MG/ML
1 INJECTION, SOLUTION SUBCUTANEOUS
Qty: 3 ML | Refills: 11 | Status: SHIPPED | OUTPATIENT
Start: 2023-08-23 | End: 2023-09-08

## 2023-08-23 NOTE — PROGRESS NOTES
The patient location is: [place]  The chief complaint leading to consultation is: obesity     Visit type: audiovisual    Time with patient: 15 minutes  20 minutes of total time spent on the encounter, which includes face to face time and non-face to face time preparing to see the patient (eg, review of tests), Obtaining and/or reviewing separately obtained history, Documenting clinical information in the electronic or other health record, Independently interpreting results (not separately reported) and communicating results to the patient/family/caregiver, or Care coordination (not separately reported).     Each patient to whom he or she provides medical services by telemedicine is:  (1) informed of the relationship between the physician and patient and the respective role of any other health care provider with respect to management of the patient; and (2) notified that he or she may decline to receive medical services by telemedicine and may withdraw from such care at any time.       Problem List Items Addressed This Visit          Endocrine    Class 3 severe obesity with body mass index (BMI) of 40.0 to 44.9 in adult - Primary    Overview     Wt Readings from Last 3 Encounters:   08/23/23 1603 (!) 140.6 kg (310 lb)   05/29/23 1604 (!) 138.3 kg (305 lb)   05/08/23 1825 (!) 140.6 kg (310 lb)   Patient denies family history and personal history of thyroid cancer, pancreatic cancer or pancreatitis.    Switch from trulicity to ozempic as appetite is not suppressed, as it previously was     General weight loss/lifestyle modification strategies discussed: limit sugary drinks, exercise 3-5x per week  Informal exercise measures discussed, e.g. taking stairs instead of elevator.      - referral to bariatrics                    Relevant Medications    semaglutide (OZEMPIC) 1 mg/dose (4 mg/3 mL)    Other Relevant Orders    Ambulatory referral/consult to Bariatric Surgery    Prediabetes    Overview     Lab Results   Component Value  Date    HGBA1C 5.5 11/11/2022   -current meds:   Diabetes Medications               semaglutide (OZEMPIC) 1 mg/dose (4 mg/3 mL) Inject 1 mg into the skin every 7 days.     -discussed the importance of limiting sugary drinks (sodas, juices, sweet teas) and participating in regular daily exercise 30 min 3-5 days weekly.   3-6 m follow up               Relevant Medications    semaglutide (OZEMPIC) 1 mg/dose (4 mg/3 mL)    Other Relevant Orders    Ambulatory referral/consult to Bariatric Surgery    Metabolic syndrome    Relevant Medications    semaglutide (OZEMPIC) 1 mg/dose (4 mg/3 mL)    Other Relevant Orders    Ambulatory referral/consult to Bariatric Surgery           Follow up in about 3 months (around 11/23/2023), or if symptoms worsen or fail to improve.    Kena Bourgeois MD  _________________________________________________________________________      Patient ID: Elton Barrios is a 38 y.o. male.    Reports he has been having trouble losing weight. Reports routine exercise and limiting sugary drinks.    He would like to switch from trulicity to ozempic as appetite is not suppressed, as it previously was.     He is also interested in bariatric surgery.       Past medical histories reviewed, including past medical, surgical, family and social histories.      Current Outpatient Medications on File Prior to Visit   Medication Sig Dispense Refill    venlafaxine (EFFEXOR-XR) 150 MG Cp24 Take 150 mg by mouth once daily.      albuterol (ACCUNEB) 1.25 mg/3 mL Nebu Take 1.25 mg by nebulization every 6 (six) hours as needed. Rescue      budesonide-formoterol 80-4.5 mcg (SYMBICORT) 80-4.5 mcg/actuation HFAA Inhale 2 puffs into the lungs. Controller      levocetirizine (XYZAL) 5 MG tablet Take 1 tablet (5 mg total) by mouth every evening. 30 tablet 11    methylphenidate HCl (RITALIN) 10 MG tablet Take 1 tablet (10 mg total) by mouth 2 (two) times daily with meals. 60 tablet 0    montelukast (SINGULAIR) 10 mg tablet Take  "10 mg by mouth every evening.      needle, disp, 18 G (HYPODERMIC NEEDLES) 18 gauge x 1 1/2" Ndle Use as directed 12 each 4    needle, disp, 23 gauge (EASY TOUCH HYPODERMIC NEEDLE) 23 gauge x 1 1/2" Ndle Use as directed 12 each 4    omeprazole (PRILOSEC) 40 MG capsule Take 40 mg by mouth once daily.      syringe, disposable, 3 mL Syrg Use as directed 12 each 4    testosterone cypionate (DEPOTESTOTERONE CYPIONATE) 200 mg/mL injection Inject 1 mL (200 mg total) into the muscle every 14 (fourteen) days. 2 mL 3    [DISCONTINUED] dulaglutide (TRULICITY) 1.5 mg/0.5 mL pen injector Inject 1.5 mg into the skin once a week. 1 pen 4    [DISCONTINUED] venlafaxine (EFFEXOR) 75 MG tablet Take 75 mg by mouth once daily at 6am.       No current facility-administered medications on file prior to visit.       Review of Systems   12 point review of systems negative except for listed in HPI.     Objective:    Nursing note and vitals reviewed.  There were no vitals filed for this visit.  Body mass index is 44.48 kg/m².     Physical Exam   No vitals or full physical exam obtained as this is a virtual visit  Gen: no distress, comfortable          We Offer Telehealth & Same Day Appointments!   Book your Telehealth appointment with me through my nurse or   Clinic appointments on [employer]!  [phone]     To Schedule appointments online, go to [employer]: [email or web address]     Answers submitted by the patient for this visit:  Review of Systems Questionnaire (Submitted on 8/23/2023)  activity change: No  unexpected weight change: Yes  neck pain: No  hearing loss: No  rhinorrhea: No  trouble swallowing: No  eye discharge: No  visual disturbance: No  chest tightness: No  wheezing: No  chest pain: No  palpitations: No  blood in stool: No  constipation: Yes  vomiting: No  diarrhea: Yes  polydipsia: No  polyuria: No  difficulty urinating: No  urgency: No  hematuria: No  joint swelling: No  arthralgias: " Yes  headaches: No  weakness: No  confusion: No  dysphoric mood: No

## 2023-08-25 ENCOUNTER — PATIENT MESSAGE (OUTPATIENT)
Dept: UROLOGY | Facility: CLINIC | Age: 38
End: 2023-08-25
Payer: OTHER GOVERNMENT

## 2023-08-25 DIAGNOSIS — E34.9 TESTOSTERONE DEFICIENCY: Primary | ICD-10-CM

## 2023-08-25 NOTE — TELEPHONE ENCOUNTER
See pt message and response. Requesting refill, LOV 1/2023. I can get him scheduled for labs and FU visit next available is October, or can schedule with Josie? Thoughts regarding refill until next visit if scheduled with you?

## 2023-08-26 RX ORDER — TESTOSTERONE CYPIONATE 200 MG/ML
200 INJECTION, SOLUTION INTRAMUSCULAR
Qty: 2 ML | Refills: 0 | Status: SHIPPED | OUTPATIENT
Start: 2023-08-26 | End: 2023-09-15 | Stop reason: SDUPTHER

## 2023-09-08 ENCOUNTER — PATIENT MESSAGE (OUTPATIENT)
Dept: FAMILY MEDICINE | Facility: CLINIC | Age: 38
End: 2023-09-08
Payer: OTHER GOVERNMENT

## 2023-09-08 ENCOUNTER — LAB VISIT (OUTPATIENT)
Dept: LAB | Facility: HOSPITAL | Age: 38
End: 2023-09-08
Attending: UROLOGY
Payer: OTHER GOVERNMENT

## 2023-09-08 DIAGNOSIS — E34.9 TESTOSTERONE DEFICIENCY: ICD-10-CM

## 2023-09-08 DIAGNOSIS — E78.00 HYPERCHOLESTEROLEMIA: ICD-10-CM

## 2023-09-08 DIAGNOSIS — E66.01 CLASS 3 SEVERE OBESITY DUE TO EXCESS CALORIES WITH SERIOUS COMORBIDITY AND BODY MASS INDEX (BMI) OF 40.0 TO 44.9 IN ADULT: ICD-10-CM

## 2023-09-08 DIAGNOSIS — R73.03 PREDIABETES: ICD-10-CM

## 2023-09-08 DIAGNOSIS — E88.810 METABOLIC SYNDROME: Primary | ICD-10-CM

## 2023-09-08 LAB
ALBUMIN SERPL BCP-MCNC: 4.2 G/DL (ref 3.5–5.2)
ALP SERPL-CCNC: 95 U/L (ref 55–135)
ALT SERPL W/O P-5'-P-CCNC: 48 U/L (ref 10–44)
ANION GAP SERPL CALC-SCNC: 10 MMOL/L (ref 8–16)
AST SERPL-CCNC: 37 U/L (ref 10–40)
BILIRUB SERPL-MCNC: 0.5 MG/DL (ref 0.1–1)
BUN SERPL-MCNC: 8 MG/DL (ref 6–20)
CALCIUM SERPL-MCNC: 9.4 MG/DL (ref 8.7–10.5)
CHLORIDE SERPL-SCNC: 106 MMOL/L (ref 95–110)
CO2 SERPL-SCNC: 23 MMOL/L (ref 23–29)
CREAT SERPL-MCNC: 0.9 MG/DL (ref 0.5–1.4)
ERYTHROCYTE [DISTWIDTH] IN BLOOD BY AUTOMATED COUNT: 13.7 % (ref 11.5–14.5)
EST. GFR  (NO RACE VARIABLE): >60 ML/MIN/1.73 M^2
GLUCOSE SERPL-MCNC: 99 MG/DL (ref 70–110)
HCT VFR BLD AUTO: 48.5 % (ref 40–54)
HGB BLD-MCNC: 16.4 G/DL (ref 14–18)
MCH RBC QN AUTO: 28.2 PG (ref 27–31)
MCHC RBC AUTO-ENTMCNC: 33.8 G/DL (ref 32–36)
MCV RBC AUTO: 83 FL (ref 82–98)
PLATELET # BLD AUTO: 252 K/UL (ref 150–450)
PMV BLD AUTO: 9.7 FL (ref 9.2–12.9)
POTASSIUM SERPL-SCNC: 3.8 MMOL/L (ref 3.5–5.1)
PROT SERPL-MCNC: 7.4 G/DL (ref 6–8.4)
RBC # BLD AUTO: 5.82 M/UL (ref 4.6–6.2)
SODIUM SERPL-SCNC: 139 MMOL/L (ref 136–145)
WBC # BLD AUTO: 13.14 K/UL (ref 3.9–12.7)

## 2023-09-08 PROCEDURE — 36415 COLL VENOUS BLD VENIPUNCTURE: CPT | Mod: PO | Performed by: UROLOGY

## 2023-09-08 PROCEDURE — 84403 ASSAY OF TOTAL TESTOSTERONE: CPT | Performed by: UROLOGY

## 2023-09-08 PROCEDURE — 80053 COMPREHEN METABOLIC PANEL: CPT | Performed by: UROLOGY

## 2023-09-08 PROCEDURE — 84153 ASSAY OF PSA TOTAL: CPT | Performed by: UROLOGY

## 2023-09-08 PROCEDURE — 85027 COMPLETE CBC AUTOMATED: CPT | Performed by: UROLOGY

## 2023-09-08 RX ORDER — DULAGLUTIDE 3 MG/.5ML
3 INJECTION, SOLUTION SUBCUTANEOUS
Qty: 4 PEN | Refills: 11 | Status: SHIPPED | OUTPATIENT
Start: 2023-09-08 | End: 2023-09-08 | Stop reason: SDUPTHER

## 2023-09-08 RX ORDER — DULAGLUTIDE 3 MG/.5ML
3 INJECTION, SOLUTION SUBCUTANEOUS
Qty: 4 PEN | Refills: 11 | Status: SHIPPED | OUTPATIENT
Start: 2023-09-08 | End: 2024-09-07

## 2023-09-08 NOTE — TELEPHONE ENCOUNTER
Care Due:                  Date            Visit Type   Department     Provider  --------------------------------------------------------------------------------                                ESTABLISHED                              PATIENT -    [facility]  Last Visit: 08-      [facility]       Kena Bourgeois                               -                              PRIMARY      [facility]  Next Visit: 11-      CARE ([facility])   MEDICINE       Kena REED [facility]  Test          Frequency    Reason                     Performed    Due Date  --------------------------------------------------------------------------------    HBA1C.......  6 months...  dulaglutide..............  11- 05-    [facility] Embedded Care Due Messages. Reference number: [id number].   9/08/2023 1:52:29 PM CDT

## 2023-09-08 NOTE — TELEPHONE ENCOUNTER
No orders of the defined types were placed in this encounter.      Medications Ordered This Encounter   Medications    dulaglutide (TRULICITY) 3 mg/0.5 mL pen injector     Sig: Inject 3 mg into the skin every 7 days.     Dispense:  4 pen      Refill:  11

## 2023-09-09 LAB
COMPLEXED PSA SERPL-MCNC: 0.35 NG/ML (ref 0–4)
TESTOST SERPL-MCNC: 445 NG/DL (ref 304–1227)

## 2023-09-15 DIAGNOSIS — E34.9 TESTOSTERONE DEFICIENCY: ICD-10-CM

## 2023-09-15 RX ORDER — TESTOSTERONE CYPIONATE 200 MG/ML
200 INJECTION, SOLUTION INTRAMUSCULAR
Qty: 2 ML | Refills: 1 | Status: SHIPPED | OUTPATIENT
Start: 2023-09-15 | End: 2023-10-25 | Stop reason: SDUPTHER

## 2023-09-20 ENCOUNTER — OFFICE VISIT (OUTPATIENT)
Dept: URGENT CARE | Facility: CLINIC | Age: 38
End: 2023-09-20
Payer: OTHER GOVERNMENT

## 2023-09-20 VITALS
BODY MASS INDEX: 44.38 KG/M2 | DIASTOLIC BLOOD PRESSURE: 57 MMHG | SYSTOLIC BLOOD PRESSURE: 104 MMHG | HEART RATE: 74 BPM | TEMPERATURE: 98 F | WEIGHT: 310 LBS | OXYGEN SATURATION: 97 % | RESPIRATION RATE: 16 BRPM | HEIGHT: 70 IN

## 2023-09-20 DIAGNOSIS — U07.1 COVID-19 VIRUS INFECTION: Primary | ICD-10-CM

## 2023-09-20 DIAGNOSIS — U07.1 COVID-19 VIRUS DETECTED: ICD-10-CM

## 2023-09-20 DIAGNOSIS — R51.9 HEADACHE IN FRONT OF HEAD: ICD-10-CM

## 2023-09-20 LAB
CTP QC/QA: YES
SARS-COV-2 AG RESP QL IA.RAPID: POSITIVE

## 2023-09-20 PROCEDURE — 99213 PR OFFICE/OUTPT VISIT, EST, LEVL III, 20-29 MIN: ICD-10-PCS | Mod: S$GLB,,, | Performed by: PHYSICIAN ASSISTANT

## 2023-09-20 PROCEDURE — 87811 SARS-COV-2 COVID19 W/OPTIC: CPT | Mod: QW,S$GLB,, | Performed by: PHYSICIAN ASSISTANT

## 2023-09-20 PROCEDURE — 99213 OFFICE O/P EST LOW 20 MIN: CPT | Mod: S$GLB,,, | Performed by: PHYSICIAN ASSISTANT

## 2023-09-20 PROCEDURE — 87811 SARS CORONAVIRUS 2 ANTIGEN POCT, MANUAL READ: ICD-10-PCS | Mod: QW,S$GLB,, | Performed by: PHYSICIAN ASSISTANT

## 2023-09-20 NOTE — LETTER
[address] [facility] [address] ? [place], [address] ? Phone [phone] ? Fax             Return to Work/School    Patient: Elton Barrios  YOB: 1985   Date: 09/20/2023      To Whom It May Concern:     Elton Barrios was in contact with/seen in my office on 09/20/2023. COVID-19 is present in our communities across the state. Not all patients are eligible or appropriate to be tested. In this situation, he meets the following criteria:     Elton Barrios has met the criteria for COVID-19 testing and has a POSITIVE result. He can return to work/school once they are asymptomatic for 24 hours without the use of fever reducing medications AND at least 5 days from the start of symptoms- May return on 09/25/23.  Patient does NOT need to be re-tested to return to work/school.        If you have any questions or concerns, or if I can be of further assistance, please do not hesitate to contact me.     Sincerely,    Lakisha Reid PA-C

## 2023-09-20 NOTE — PATIENT INSTRUCTIONS
You have tested positive for COVID-19 today.      Please note that patients who test positive for COVID-19 are required by the CDC to undergo isolation for 5 days after their symptoms first began.   - If you tested positive and do not have symptoms, you must isolate for 5 days starting on the day of the positive test.   - If you tested positive and have symptoms, you must isolate for 5 days starting on the day of the first symptoms,  not the day of the positive test.    This is the most important part, both the CDC and the [facility] emphasize that you do not test out of isolation.  In fact, we do not retest if you were positive in the last 90 days.    After 5 days, if your symptoms have improved and you have not had fever on day 5, you can return to the community on day 6- NO TESTING REQUIRED!     After your 5 days of isolation are completed, the CDC recommends strict mask use for the first 5 days that you come out of isolation.     During quarantine:   Separate yourself from other people and animals in your home.  Call ahead before visiting your doctor.  Wear a facemask.  Cover your coughs and sneezes.  Wash your hands often with soap and water; hand  can be used, too.  Avoid sharing personal household items.  Wipe down surfaces used daily.  Monitor your symptoms. Seek prompt medical attention if your illness is worsening (e.g., difficulty breathing).   Before seeking care, call your healthcare provider.  If you have a medical emergency and need to call 911, notify the dispatch personnel that you have, or are being evaluated for COVID-19. If possible, put on a facemask before emergency medical services arrive.         CDC Testing and Quarantine Guidelines for household members, intimate partners, and caregivers in a home setting of a known-positive COVID-19 person:    ·     A 'close exposure' is defined as anyone who has had an exposure (masked or unmasked) to a known COVID -19 positive person within 6 feet of  someone for a cumulative total of 15 minutes or more over a 24-hour period.    ·     Vaccinated: patients who have been boosted or completed the primary series of Pfizer or Moderna vaccine within the last 6 months or completed the primary series of J&J vaccine within the last 2 months and/or had a positive test within 90 days   - do NOT need to quarantine after contact with someone who had COVID-19 unless they have symptoms.   - should get tested 3-5 days after their exposure (if they have not had a positive test within the last 90 days), even if they don't have symptoms and wear a mask indoors in public for 10 days following exposure or until their test result is negative on day 5.  - If you develop symptoms test and quarantine.    ·     Unvaccinated, or are more than six months out from their second mRNA dose (or more than 2 months after the J&J vaccine) and not yet boosted,  and/or NOT had a positive test within 90 days and meet 'close exposure'  - you are required by CDC guidelines to quarantine for at least 5 days from time of exposure followed by 5 days of strict masking. It is recommended, but not required to test after 5 days, unless you develop symptoms, in which case you should test at that time.  - If you do decide to test at 5 days and are asymptomatic, the risk is that if you test without symptoms (on Day 5 for example) and you are positive, your 5 day isolation begins on that day, and you turned your 5 day quarantine into 10 days.  - If your exposure does not meet the above definition, you can return to your normal daily activities to include social distancing, wearing a mask and frequent handwashing.       Close contacts should also follow these recommendations:  Make sure that you understand and can help the patient follow their provider's instructions for medication(s) and care. You should help the patient with basic needs in the home and provide support for getting groceries, prescriptions, and  other personal needs.  Monitor the patient's symptoms. If the patient is getting sicker, call his or her healthcare provider and tell them that the patient has laboratory-confirmed COVID-19. If the patient has a medical emergency and you need to call 911, notify the dispatch personnel that the patient has, or is being evaluated for COVID-19.  Household members should stay in another room or be  from the patient. Household members should use a separate bedroom and bathroom, if available.  Prohibit visitors.  Household members should care for any pets in the home.  Make sure that shared spaces in the home have good air flow, such as by an air conditioner or an opened window, weather permitting.  Perform hand hygiene frequently. Wash your hands often with soap and water for at least 20 seconds or use an alcohol-based hand  (that contains > 60% alcohol) covering all surfaces of your hands and rubbing them together until they feel dry. Soap and water should be used preferentially.  Avoid touching your eyes, nose, and mouth.  The patient should wear a facemask. If the patient is not able to wear a facemask (for example, because it causes trouble breathing), caregivers should wear a mask when they are in the same room as the patient.  Wear a disposable facemask and gloves when you touch or have contact with the patient's blood, stool, or body fluids, such as saliva, sputum, nasal mucus, vomit, urine.  Throw out disposable facemasks and gloves after using them. Do not reuse.  When removing personal protective equipment, first remove and dispose of gloves. Then, immediately clean your hands with soap and water or alcohol-based hand . Next, remove and dispose of facemask, and immediately clean your hands again with soap and water or alcohol-based hand .  You should not share dishes, drinking glasses, cups, eating utensils, towels, bedding, or other items with the patient. After the patient  uses these items, you should wash them thoroughly (see below Wash laundry thoroughly).  Clean all high-touch surfaces, such as counters, tabletops, doorknobs, bathroom fixtures, toilets, phones, keyboards, tablets, and bedside tables, every day. Also, clean any surfaces that may have blood, stool, or body fluids on them.  Use a household cleaning spray or wipe, according to the label instructions. Labels contain instructions for safe and effective use of the cleaning product including precautions you should take when applying the product, such as wearing gloves and making sure you have good ventilation during use of the product.  Wash laundry thoroughly.  Immediately remove and wash clothes or bedding that have blood, stool, or body fluids on them.  Wear disposable gloves while handling soiled items and keep soiled items away from your body. Clean your hands (with soap and water or an alcohol-based hand ) immediately after removing your gloves.  Read and follow directions on labels of laundry or clothing items and detergent. In general, using a normal laundry detergent according to washing machine instructions and dry thoroughly using the warmest temperatures recommended on the clothing label.  Place all used disposable gloves, facemasks, and other contaminated items in a lined container before disposing of them with other household waste. Clean your hands (with soap and water or an alcohol-based hand ) immediately after handling these items. Soap and water should be used preferentially if hands are visibly dirty.  Discuss any additional questions with your state or local health department or healthcare provider. Check available hours when contacting your local health department.     You must understand that you've received an Urgent Care treatment only and that you may be released before all your medical problems are known or treated. You, the patient, will arrange for follow up care as  instructed. Follow up with your PCP or specialty clinic as directed within 2-5 days if not improved or as needed.  You can call [phone] to schedule an appointment with the appropriate provider.  If your condition worsens we recommend that you receive another evaluation at the emergency room immediately or contact your primary medical clinics after hours call service to discuss your concerns.  Please return here or go to the Emergency Department for any concerns or worsening of condition.       If we discussed the COVID surveillance/home monitoring program, you will also get a call from [facility] pharmacy at the [place] or [facility] location to get a pulse oximeter to monitor your blood oxygen level.  This will be followed by a COVID surveillance team daily through [employer] (available on computer or through mobile ming).

## 2023-09-20 NOTE — PROGRESS NOTES
"Subjective:      Patient ID: Elton Barrios is a 38 y.o. male.    Vitals:  height is 5' 10" (1.778 m) and weight is 140.6 kg (310 lb) (abnormal). His tympanic temperature is 98.4 °F (36.9 °C). His blood pressure is 104/57 (abnormal) and his pulse is 74. His respiration is 16 and oxygen saturation is 97%.     Chief Complaint: Sore Throat    Pt presents to the clinic today with sore throat,body aches, headache, fatigue, and nausea that began about 2 days ago. Has not taken any medications today. Family members also with similar symptoms + direct covid exposure.     Sore Throat   This is a new problem. The current episode started in the past 7 days. The problem has been gradually worsening. Neither side of throat is experiencing more pain than the other. There has been no fever. The pain is at a severity of 5/10. The pain is moderate. Associated symptoms include congestion and headaches. Pertinent negatives include no abdominal pain, coughing, diarrhea, ear pain, hoarse voice, plugged ear sensation, neck pain, shortness of breath, stridor, swollen glands, trouble swallowing or vomiting. He has had no exposure to strep or mono. Treatments tried: Theraflu and Flonase. The treatment provided mild relief.       Constitution: Positive for fatigue. Negative for fever.   HENT:  Positive for congestion and sore throat. Negative for ear pain and trouble swallowing.    Neck: Negative for neck pain.   Respiratory:  Negative for cough, shortness of breath and stridor.    Gastrointestinal:  Positive for nausea (on & off). Negative for abdominal pain, vomiting and diarrhea.   Musculoskeletal:  Positive for muscle ache.   Neurological:  Positive for headaches.      Objective:     Physical Exam   Constitutional: He appears well-developed.  Non-toxic appearance. He does not appear ill. No distress.   HENT:   Head: Normocephalic and atraumatic.   Ears:   Right Ear: External ear normal.   Left Ear: External ear normal.   Nose: Nose " normal.   Mouth/Throat: Mucous membranes are moist. Posterior oropharyngeal erythema present. No oropharyngeal exudate.   Eyes: Conjunctivae and EOM are normal.   Neck: Neck supple.   Pulmonary/Chest: Effort normal and breath sounds normal.   Abdominal: Normal appearance.   Musculoskeletal: Normal range of motion.         General: Normal range of motion.   Neurological: no focal deficit. He is alert. He displays no weakness. Gait normal.   Skin: Skin is warm, dry, not diaphoretic, not pale and no rash.   Psychiatric: His behavior is normal.     Results for orders placed or performed in visit on 09/20/23   SARS Coronavirus 2 Antigen, POCT Manual Read   Result Value Ref Range    SARS Coronavirus 2 Antigen Positive (A) Negative     Acceptable Yes          Assessment:     1. COVID-19 virus infection    2. Headache in front of head        Plan:     - Rapid COVID-19 positive    - Covid Risk Score: 3 Pt considered moderate/high risk (score>3) and therefor qualifies for Paxlovid. Discussed with pt that this treatment is optional, but should be started within first 5 days of symptoms if taken. Also discussed common side effects of medication. Pt voiced understanding.   - Advised patient to stay home and self quarantine for 5 days from onset of symptoms. Advised must be fever free for at least 24 hours to discontinue isolation.  - Tylenol and/or NSAIDs as needed for fever/pain control.   - OTC medications prn for cold symptoms.   - Supportive care for sore throat (salt water gargles, lozenges, chloraseptic spray, cough drops, warm tea, etc.).   - Rest and drink plenty of fluids.  - Strict ED precautions given for any emergent symptoms.    COVID-19 virus infection  -     nirmatrelvir-ritonavir 300 mg (150 mg x 2)-100 mg copackaged tablets (EUA); Take 3 tablets by mouth 2 (two) times daily for 5 days. Each dose contains 2 nirmatrelvir (pink tablets) and 1 ritonavir (white tablet). Take all 3 tablets together   Dispense: 30 tablet; Refill: 0    Headache in front of head  -     SARS Coronavirus 2 Antigen, POCT Manual Read

## 2023-09-23 ENCOUNTER — TELEPHONE (OUTPATIENT)
Dept: URGENT CARE | Facility: CLINIC | Age: 38
End: 2023-09-23
Payer: OTHER GOVERNMENT

## 2023-09-23 NOTE — TELEPHONE ENCOUNTER
Spoke with patient and he said he is feeling better. I advised to call or come in if he needed anything.

## 2023-09-29 DIAGNOSIS — J45.20 MILD INTERMITTENT ASTHMA WITHOUT COMPLICATION: ICD-10-CM

## 2023-09-30 RX ORDER — LEVOCETIRIZINE DIHYDROCHLORIDE 5 MG/1
5 TABLET, FILM COATED ORAL NIGHTLY
Qty: 90 TABLET | Refills: 3 | Status: SHIPPED | OUTPATIENT
Start: 2023-09-30

## 2023-09-30 NOTE — TELEPHONE ENCOUNTER
Refill Decision Note   Elton Sharmachela  is requesting a refill authorization.  Brief Assessment and Rationale for Refill:  Approve     Medication Therapy Plan:         Comments:     Note composed:7:23 AM 09/30/2023

## 2023-09-30 NOTE — TELEPHONE ENCOUNTER
No care due was identified.  [facility] Embedded Care Due Messages. Reference number: [id number].   9/29/2023 10:39:35 PM CDT

## 2023-10-23 ENCOUNTER — HOSPITAL ENCOUNTER (OUTPATIENT)
Dept: RADIOLOGY | Facility: HOSPITAL | Age: 38
Discharge: HOME | End: 2023-10-23
Attending: STUDENT IN AN ORGANIZED HEALTH CARE EDUCATION/TRAINING PROGRAM
Payer: OTHER GOVERNMENT

## 2023-10-23 ENCOUNTER — OFFICE VISIT (OUTPATIENT)
Dept: FAMILY MEDICINE | Facility: CLINIC | Age: 38
End: 2023-10-23
Payer: OTHER GOVERNMENT

## 2023-10-23 VITALS
OXYGEN SATURATION: 96 % | BODY MASS INDEX: 45 KG/M2 | TEMPERATURE: 97 F | SYSTOLIC BLOOD PRESSURE: 138 MMHG | HEART RATE: 71 BPM | WEIGHT: 314.31 LBS | DIASTOLIC BLOOD PRESSURE: 81 MMHG | RESPIRATION RATE: 18 BRPM | HEIGHT: 70 IN

## 2023-10-23 DIAGNOSIS — R73.03 PREDIABETES: ICD-10-CM

## 2023-10-23 DIAGNOSIS — E34.9 TESTOSTERONE DEFICIENCY: ICD-10-CM

## 2023-10-23 DIAGNOSIS — M54.41 ACUTE BILATERAL LOW BACK PAIN WITH BILATERAL SCIATICA: ICD-10-CM

## 2023-10-23 DIAGNOSIS — J45.20 MILD INTERMITTENT ASTHMA WITHOUT COMPLICATION: ICD-10-CM

## 2023-10-23 DIAGNOSIS — E66.01 CLASS 3 SEVERE OBESITY DUE TO EXCESS CALORIES WITH SERIOUS COMORBIDITY AND BODY MASS INDEX (BMI) OF 40.0 TO 44.9 IN ADULT: ICD-10-CM

## 2023-10-23 DIAGNOSIS — Z23 IMMUNIZATION DUE: ICD-10-CM

## 2023-10-23 DIAGNOSIS — M54.41 ACUTE BILATERAL LOW BACK PAIN WITH BILATERAL SCIATICA: Primary | ICD-10-CM

## 2023-10-23 DIAGNOSIS — L84 CALLUS: ICD-10-CM

## 2023-10-23 DIAGNOSIS — M54.42 ACUTE BILATERAL LOW BACK PAIN WITH BILATERAL SCIATICA: Primary | ICD-10-CM

## 2023-10-23 DIAGNOSIS — M54.42 ACUTE BILATERAL LOW BACK PAIN WITH BILATERAL SCIATICA: ICD-10-CM

## 2023-10-23 LAB
BILIRUB UR QL STRIP: NEGATIVE
CLARITY UR: CLEAR
COLOR UR: YELLOW
GLUCOSE UR QL STRIP: NEGATIVE
HGB UR QL STRIP: NEGATIVE
KETONES UR QL STRIP: NEGATIVE
LEUKOCYTE ESTERASE UR QL STRIP: NEGATIVE
NITRITE UR QL STRIP: NEGATIVE
PH UR STRIP: 6.5 [PH] (ref 5–8)
PROT UR QL STRIP: NEGATIVE
SP GR UR STRIP: 1.02 (ref 1–1.03)
URN SPEC COLLECT METH UR: NORMAL

## 2023-10-23 PROCEDURE — 99999 PR PBB SHADOW E&M-EST. PATIENT-LVL V: ICD-10-PCS | Mod: PBBFAC,,, | Performed by: STUDENT IN AN ORGANIZED HEALTH CARE EDUCATION/TRAINING PROGRAM

## 2023-10-23 PROCEDURE — 72100 X-RAY EXAM L-S SPINE 2/3 VWS: CPT | Mod: TC,PO

## 2023-10-23 PROCEDURE — 99214 PR OFFICE/OUTPT VISIT, EST, LEVL IV, 30-39 MIN: ICD-10-PCS | Mod: S$PBB,,, | Performed by: STUDENT IN AN ORGANIZED HEALTH CARE EDUCATION/TRAINING PROGRAM

## 2023-10-23 PROCEDURE — 72202 X-RAY EXAM SI JOINTS 3/> VWS: CPT | Mod: TC,PO

## 2023-10-23 PROCEDURE — G0008 ADMIN INFLUENZA VIRUS VAC: HCPCS | Mod: PBBFAC,PO

## 2023-10-23 PROCEDURE — 99999PBSHW PR PBB SHADOW TECHNICAL ONLY FILED TO HB: ICD-10-PCS | Mod: PBBFAC,,,

## 2023-10-23 PROCEDURE — 99214 OFFICE O/P EST MOD 30 MIN: CPT | Mod: S$PBB,,, | Performed by: STUDENT IN AN ORGANIZED HEALTH CARE EDUCATION/TRAINING PROGRAM

## 2023-10-23 PROCEDURE — 72202 XR SACROILIAC JOINTS COMPLETE: ICD-10-PCS | Mod: 26,,, | Performed by: RADIOLOGY

## 2023-10-23 PROCEDURE — 99999PBSHW FLU VACCINE (QUAD) GREATER THAN OR EQUAL TO 3YO PRESERVATIVE FREE IM: Mod: PBBFAC,,,

## 2023-10-23 PROCEDURE — 81003 URINALYSIS AUTO W/O SCOPE: CPT | Mod: PO | Performed by: STUDENT IN AN ORGANIZED HEALTH CARE EDUCATION/TRAINING PROGRAM

## 2023-10-23 PROCEDURE — 99999PBSHW PR PBB SHADOW TECHNICAL ONLY FILED TO HB: Mod: PBBFAC,,,

## 2023-10-23 PROCEDURE — 72100 X-RAY EXAM L-S SPINE 2/3 VWS: CPT | Mod: 26,,, | Performed by: RADIOLOGY

## 2023-10-23 PROCEDURE — 96372 THER/PROPH/DIAG INJ SC/IM: CPT | Mod: PBBFAC,59,PO

## 2023-10-23 PROCEDURE — 99215 OFFICE O/P EST HI 40 MIN: CPT | Mod: PBBFAC,PO | Performed by: STUDENT IN AN ORGANIZED HEALTH CARE EDUCATION/TRAINING PROGRAM

## 2023-10-23 PROCEDURE — 99999 PR PBB SHADOW E&M-EST. PATIENT-LVL V: CPT | Mod: PBBFAC,,, | Performed by: STUDENT IN AN ORGANIZED HEALTH CARE EDUCATION/TRAINING PROGRAM

## 2023-10-23 PROCEDURE — 72100 XR LUMBAR SPINE AP AND LATERAL: ICD-10-PCS | Mod: 26,,, | Performed by: RADIOLOGY

## 2023-10-23 PROCEDURE — 72202 X-RAY EXAM SI JOINTS 3/> VWS: CPT | Mod: 26,,, | Performed by: RADIOLOGY

## 2023-10-23 RX ORDER — BUDESONIDE AND FORMOTEROL FUMARATE DIHYDRATE 80; 4.5 UG/1; UG/1
2 AEROSOL RESPIRATORY (INHALATION) DAILY
Qty: 10.2 G | Refills: 1 | Status: SHIPPED | OUTPATIENT
Start: 2023-10-23

## 2023-10-23 RX ORDER — GABAPENTIN 300 MG/1
300 CAPSULE ORAL NIGHTLY
Qty: 30 CAPSULE | Refills: 2 | Status: SHIPPED | OUTPATIENT
Start: 2023-10-23

## 2023-10-23 RX ORDER — DEXAMETHASONE SODIUM PHOSPHATE 4 MG/ML
8 INJECTION, SOLUTION INTRA-ARTICULAR; INTRALESIONAL; INTRAMUSCULAR; INTRAVENOUS; SOFT TISSUE
Status: COMPLETED | OUTPATIENT
Start: 2023-10-23 | End: 2023-10-23

## 2023-10-23 RX ORDER — METHOCARBAMOL 500 MG/1
500 TABLET, FILM COATED ORAL 4 TIMES DAILY
Qty: 40 TABLET | Refills: 0 | Status: SHIPPED | OUTPATIENT
Start: 2023-10-23 | End: 2023-12-08

## 2023-10-23 RX ADMIN — DEXAMETHASONE SODIUM PHOSPHATE 8 MG: 4 INJECTION INTRA-ARTICULAR; INTRALESIONAL; INTRAMUSCULAR; INTRAVENOUS; SOFT TISSUE at 11:10

## 2023-10-23 NOTE — PROGRESS NOTES
I have sent a msg to patient with the following interpretation (see below):    Mild arthritis in low back. Continue treatment plan as discussed in clinic.     Please do not hesitate to call or message with any questions or concerns    Kena Bourgeois MD

## 2023-10-23 NOTE — PATIENT INSTRUCTIONS
Tj Conde,     If you are due for any health screening(s) below please notify me so we can arrange them to be ordered and scheduled. Most healthy patients at your age complete them, but you are free to accept or refuse.     If you can't do it, I'll definitely understand. If you can, I'd certainly appreciate it!    All of your core healthy metrics are met.

## 2023-10-23 NOTE — PROGRESS NOTES
Problem List Items Addressed This Visit          Pulmonary    Mild intermittent asthma    Overview     Well controlled with Symbicort, and singular; seldom use of albuterol         Relevant Medications    budesonide-formoterol 80-4.5 mcg (SYMBICORT) 80-4.5 mcg/actuation HFAA       Endocrine    Class 3 severe obesity with body mass index (BMI) of 40.0 to 44.9 in adult    Overview     Wt Readings from Last 3 Encounters:   10/23/23 1059 (!) 142.6 kg (314 lb 4.8 oz)   09/20/23 1326 (!) 140.6 kg (310 lb)   08/23/23 1603 (!) 140.6 kg (310 lb)   Patient denies family history and personal history of thyroid cancer, pancreatic cancer or pancreatitis.    Switch from trulicity to ozempic as appetite is not suppressed, as it previously was     General weight loss/lifestyle modification strategies discussed: limit sugary drinks, exercise 3-5x per week  Informal exercise measures discussed, e.g. taking stairs instead of elevator.      - referral to bariatrics                    Testosterone deficiency    Overview     Lab Results   Component Value Date    TOTALTESTOST 445 09/08/2023     Lab Results   Component Value Date    WBC 13.14 (H) 09/08/2023    HGB 16.4 09/08/2023    HCT 48.5 09/08/2023    MCV 83 09/08/2023     09/08/2023   Follows with urology          Prediabetes    Overview     Lab Results   Component Value Date    HGBA1C 5.5 11/11/2022     Diabetes Medications               dulaglutide (TRULICITY) 3 mg/0.5 mL pen injector Inject 3 mg into the skin every 7 days.       -discussed the importance of limiting sugary drinks (sodas, juices, sweet teas) and participating in regular daily exercise 30 min 3-5 days weekly.   3-6 m follow up    Due for repeat labs               Orthopedic    Acute bilateral low back pain with bilateral sciatica - Primary    Overview     Acute onset; no red flag sx    - will send for XR l spine and SI joints   - referral to Physical Therapy   - prn voltaren gel and msk relaxer, gabapentin  -  prn tylenol, heat/ice therapy, stretches   -prn rtc          Relevant Medications    gabapentin (NEURONTIN) 300 MG capsule    methocarbamoL (ROBAXIN) 500 MG Tab    Other Relevant Orders    X-Ray Lumbar Spine AP And Lateral (Completed)    Urinalysis, Reflex to Urine Culture Urine, Clean Catch (Completed)    X-Ray Sacroiliac Joints Complete (Completed)     Other Visit Diagnoses       Callus        Relevant Orders    Ambulatory referral/consult to Podiatry    Immunization due        Relevant Orders    Influenza - Quadrivalent *Preferred* (6 months+) (PF) (Completed)                Follow up in about 6 months (around 4/23/2024), or if symptoms worsen or fail to improve, for vaccine today, Imaging today.    Kena Bourgeois MD  _________________________________________________________________________      Patient ID: Elton Barrios is a 38 y.o. male.    Chief Complaint:  low back pain    Pt presents for evaluation of low back pain. Symptoms have been present for 3 months and are gradually worsening.  The patient has had recurrent self limited episodes of low back pain in the past. Onset was related to / precipitated by no known injury. The pain is located in the low back radiates to L buttock and down L leg. the pain is described as sharp and occurs all day. Symptoms are exacerbated by sitting. Symptoms are improved by stretching. Patient has also tried acetaminophen and NSAIDs which provided no symptom relief.     Denies previous back surgeries/injections/or procedures.     Denies fevers, chills, night sweats, previous hx of cancer, weight loss, urinary/fecal incontinence or retention, parasthesias, or saddle anesthesia   No hx kidney stones or hematuria        Past medical histories reviewed, including past medical, surgical, family and social histories.      Current Outpatient Medications on File Prior to Visit   Medication Sig Dispense Refill    albuterol (ACCUNEB) 1.25 mg/3 mL Nebu Take 1.25 mg by nebulization  every 6 (six) hours as needed. Rescue      dulaglutide (TRULICITY) 3 mg/0.5 mL pen injector Inject 3 mg into the skin every 7 days. 4 pen 11    levocetirizine (XYZAL) 5 MG tablet TAKE 1 TABLET(5 MG) BY MOUTH EVERY EVENING 90 tablet 3    montelukast (SINGULAIR) 10 mg tablet Take 10 mg by mouth every evening.      omeprazole (PRILOSEC) 40 MG capsule Take 40 mg by mouth once daily.      venlafaxine (EFFEXOR-XR) 150 MG Cp24 Take 150 mg by mouth once daily.      methylphenidate HCl (RITALIN) 10 MG tablet Take 1 tablet (10 mg total) by mouth 2 (two) times daily with meals. 60 tablet 0     No current facility-administered medications on file prior to visit.       Review of Systems   12 point review of systems negative except for listed in HPI.     Objective:    Nursing note and vitals reviewed.  Vitals:    10/23/23 1059   BP: 138/81   Pulse: 71   Resp: 18   Temp: 97.3 °F (36.3 °C)     Body mass index is 45.1 kg/m².     Physical Exam   Constitutional: oriented to person, place, and time. well-developed and well-nourished. No distress.   HENT: WNL  Head: Normocephalic and atraumatic.   Eyes: EOM are normal.   Neck: Normal range of motion. Neck supple.   Cardiovascular: Normal rate  Pulmonary/Chest: Effort normal. No respiratory distress.   Musculoskeletal: Normal range of motion. no edema.   Neurological: CN II-XII intact  Skin: warm and dry.   Psychiatric: normal mood and affect. behavior is normal.           We Offer Telehealth & Same Day Appointments!   Book your Telehealth appointment with me through my nurse or   Clinic appointments on [employer]!  [phone]     To Schedule appointments online, go to [employer]: [email or web address]

## 2023-10-24 ENCOUNTER — OFFICE VISIT (OUTPATIENT)
Dept: PODIATRY | Facility: CLINIC | Age: 38
End: 2023-10-24
Payer: OTHER GOVERNMENT

## 2023-10-24 VITALS — BODY MASS INDEX: 44.95 KG/M2 | WEIGHT: 314 LBS | HEIGHT: 70 IN

## 2023-10-24 DIAGNOSIS — L84 CALLUS: ICD-10-CM

## 2023-10-24 DIAGNOSIS — R23.4 FISSURE IN SKIN OF FOOT: Primary | ICD-10-CM

## 2023-10-24 PROCEDURE — 99999 PR PBB SHADOW E&M-EST. PATIENT-LVL IV: CPT | Mod: PBBFAC,,, | Performed by: PODIATRIST

## 2023-10-24 PROCEDURE — 99203 PR OFFICE/OUTPT VISIT, NEW, LEVL III, 30-44 MIN: ICD-10-PCS | Mod: S$PBB,,, | Performed by: PODIATRIST

## 2023-10-24 PROCEDURE — 99203 OFFICE O/P NEW LOW 30 MIN: CPT | Mod: S$PBB,,, | Performed by: PODIATRIST

## 2023-10-24 PROCEDURE — 99999 PR PBB SHADOW E&M-EST. PATIENT-LVL IV: ICD-10-PCS | Mod: PBBFAC,,, | Performed by: PODIATRIST

## 2023-10-24 PROCEDURE — 99214 OFFICE O/P EST MOD 30 MIN: CPT | Mod: PBBFAC,PO | Performed by: PODIATRIST

## 2023-10-24 NOTE — PROGRESS NOTES
PODIATRIC MEDICINE AND SURGERY        CHIEF COMPLAINT   Chief Complaint   Patient presents with    Callouses     C/o callus on the bottom of both feet, pt states the pain is a 1/10 only bc of the the cracks in his feet, pt is non-diabetic         HPI:    Elton Barrios is a 38 y.o. male presenting to podiatry clinic with complaint of painful lesion on bottom of both feet. Pt has tried OTC treatment without relief. Symptoms are aggravated with pressure and/or ambulation or certain shoewear.  Patient inquires about available treatment options. No further pedal complaints.      PMH  Past Medical History:   Diagnosis Date    ADHD (attention deficit hyperactivity disorder)     Allergy     Anxiety     Asthma     Depression     GERD (gastroesophageal reflux disease)        PROBLEM LIST  Patient Active Problem List    Diagnosis Date Noted    Metabolic syndrome 08/23/2023    Prediabetes 04/03/2023    Testosterone deficiency 09/06/2022    Mild intermittent asthma 05/11/2022    Gastroesophageal reflux disease without esophagitis 05/11/2022    PTSD (post-traumatic stress disorder) 05/11/2022    Attention deficit hyperactivity disorder (ADHD), combined type 05/11/2022    Class 3 severe obesity with body mass index (BMI) of 40.0 to 44.9 in adult 05/11/2022       MEDS  Current Outpatient Medications on File Prior to Visit   Medication Sig Dispense Refill    albuterol (ACCUNEB) 1.25 mg/3 mL Nebu Take 1.25 mg by nebulization every 6 (six) hours as needed. Rescue      budesonide-formoterol 80-4.5 mcg (SYMBICORT) 80-4.5 mcg/actuation HFAA Inhale 2 puffs into the lungs once daily. Controller 10.2 g 1    dulaglutide (TRULICITY) 3 mg/0.5 mL pen injector Inject 3 mg into the skin every 7 days. 4 pen 11    gabapentin (NEURONTIN) 300 MG capsule Take 1 capsule (300 mg total) by mouth every evening. May cause drowsiness 30 capsule 2    levocetirizine (XYZAL) 5 MG tablet TAKE 1 TABLET(5 MG) BY MOUTH EVERY EVENING 90 tablet 3     methocarbamoL (ROBAXIN) 500 MG Tab Take 1 tablet (500 mg total) by mouth 4 (four) times daily. for 10 days 40 tablet 0    montelukast (SINGULAIR) 10 mg tablet Take 10 mg by mouth every evening.      omeprazole (PRILOSEC) 40 MG capsule Take 40 mg by mouth once daily.      syringe, disposable, 3 mL Syrg Use as directed 12 each 4    testosterone cypionate (DEPOTESTOTERONE CYPIONATE) 200 mg/mL injection Inject 1 mL (200 mg total) into the muscle every 14 (fourteen) days. 2 mL 1    venlafaxine (EFFEXOR-XR) 150 MG Cp24 Take 150 mg by mouth once daily.      methylphenidate HCl (RITALIN) 10 MG tablet Take 1 tablet (10 mg total) by mouth 2 (two) times daily with meals. 60 tablet 0     Current Facility-Administered Medications on File Prior to Visit   Medication Dose Route Frequency Provider Last Rate Last Admin    [COMPLETED] dexAMETHasone injection 8 mg  8 mg Intramuscular 1 time in Clinic/HOD Kena Bourgeois MD   8 mg at 10/23/23 1140       PSH     Past Surgical History:   Procedure Laterality Date    ANKLE FRACTURE SURGERY Left 2006    ANKLE SURGERY Left 2006    bone infection    APPENDECTOMY      CARPAL TUNNEL RELEASE      KNEE ASPIRATION Right 2005        ALL  Review of patient's allergies indicates:  No Known Allergies    SOC     Social History     Tobacco Use    Smoking status: Former     Current packs/day: 0.50     Average packs/day: 0.5 packs/day for 20.0 years (10.0 ttl pk-yrs)     Types: Cigarettes    Smokeless tobacco: Never         Family HX    Family History   Problem Relation Age of Onset    Prostate cancer Neg Hx             REVIEW OF SYSTEMS  General: Denies any fever or chills  Chest: Denies shortness of breath, wheezing, coughing, or sputum production  Heart: Denies chest pain, cold extremities, orthopenia, or reduced exercise tolerance  As noted above and per history of current illness above, otherwise negative in the remainder of the 14 systems.      PHYSICAL EXAM:      Vitals:    10/24/23 0818  "  Weight: (!) 142.4 kg (314 lb)   Height: 5' 10" (1.778 m)   PainSc:   1       General: This patient is well-developed, well-nourished and appears stated age, well-oriented to person, place and time, and cooperative and pleasant on today's visit    LOWER EXTREMITY PHYSICAL EXAM  VASCULAR  Dorsalis pedis and posterior tibial pulses palpable 2/4 bilaterally.   Capillary refill time immediate to the toes.   Feet are warm to the touch. Skin temperature warm to warm from proximally to distally   There are no varicosities, telangiectasias noted to bilateral foot and ankle regions.   There are no ecchymoses noted to bilateral foot and ankle regions.   There is no gross lower extremity edema.    DERMATOLOGIC  There are no open ulcerations, lacerations, or fissures to bilateral foot and ankle regions. There are no signs of infection as there is no erythema, no proximal-extending lymphangiitis, no fluctuance, or crepitus noted on palpation to bilateral foot and ankle regions.   There is no interdigital maceration.   There are hyperkeratotic lesions noted to b/l plantar feet. There is fissure b/l heel and plantar midfoot LEFT.    NEUROLOGIC  Epicritic sensation is intact as the patient is able to sense light touch to bilateral foot and ankle regions.   Achilles and patellar deep tendon reflexes intact  Babinski reflex absent    ORTHOPEDIC/BIOMECHANICAL  Pain with palpation of above noted lesion   Muscle strength AT/EHL/EDL/PT: 5/5; Achilles/Gastroc/Soleus: 5/5; PB/PL: 5/5 Muscle tone is normal.  Ankle joint ROM INTACT DF/PF, non-crepitus      ASSESSMENT   Fissure in skin of foot    Callus  -     Ambulatory referral/consult to Podiatry        PLAN    1. Patient was educated about clinical and imaging findings, and verbalizes understanding of above.      -With patient's permission via verbal consent, the involved area was cleansed with an alcohol swab. Trimming of hyperkeratotic lesions deep to epidermal layer x 3 was performed " with a #15 blade without incident. Patient relates relief following the procedure. Patient will continue to monitor the areas daily, inspect feet, wear protective shoe gear when ambulatory, moisturizer to maintain skin integrity.    -Rx for Urea 40 for calluses and callus care treatment instructions provided.           Report Electronically Signed By:     Loida Rashid DPM   Podiatry  [facility]  10/24/2023

## 2023-10-24 NOTE — PATIENT INSTRUCTIONS
Thank you for choosing [facility] and Dr. Loida Rashid and her team to take care of you.      I have provided further information for you about your diagnoses and/or aftercare for treatment.     You may receive a survey asking you about your visit today. We hope that we have provided you with a 5-star experience! If you felt that you received exemplary care today, please consider leaving us this feedback on the survey that you will receive in the next few days.     The survey might arrive via email, 500px messages, text messages, or paper mail.    We sincerely appreciate you!      Sincerely,  Dr. Loida Rashid       In order to moisturize your heels/bottom of foot, I recommend to purchase one of the below moisturizers that work well with dry skin/calluses. You should apply to bottom of your feet twice daily for best results.     You can purchase UREA 40% cream online: [email or web address]   Urea 40% Foot Cream 4 oz - Best Callus Remover - Moisturizes & Rehydrates Thick, Cracked, Rough, Dead & Dry Skin - For Feet, Elbows and Hands + Free Pumice Stone - 100% Money Back Guarantee   4.6 out of 5 stars 1,181   $14.99 Prime       The Secret to Healing Dry Cracked Heels  If you have been neglecting taking care of your feet, you are probably dealing with dry cracked heels Good skin care of the feet is just as important as other areas of the body. Though, treating the skin around the heels can be challenging. The secret to healing dry cracked heels: EXFOLIATION  In order to get the heel soft and smooth, dead skin must first be removed. Exfoliation can be done mechanically using a pumice stone or foot file. You can also help exfoliate dead skin chemically by using foot creams that contain urea or lactate. Applying foot products that only moisturize the feet will not work as well in healing dry cracked heels.     STEPS TO HEAL DRY CRACKED HEELS  DAY TIME:  Soak feet in warm (not hot) water for 10  minutes.  Exfoliate the skin using a pumice stone or non-metal foot file. Remove as much dry thickened skin as possible. Try to file in one direction to avoid tearing the skin. DO NOT PERFORM THIS IF YOU ARE DIABETIC unless notified by your podiatrist  Apply over-the-counter foot cream that contains urea and/or lactate or the prescription that your podiatrist sent for you (UREA 40% or AMLACTIN) Wear socks throughout the day.    NIGHT TIME:  After a shower/bath, apply a petroleum based ointment (or petroleum jelly) while the skin is still damp to help seal in moisture.  Apply another layer of urea/lactate foot cream that was prescribed to you  Cover the feet with socks, or even plastic wrap, and wear overnight.        If you are like me and find sleeping with socks uncomfortable, cut the ends of a pair of old socks to only cover your heels. Your toes will stay cool and comfortable while the heels get their treatment.    REPEAT DAY AND NIGHT TIME UNTIL FEET ARE SMOOTH.        Some important things to know:  Avoid putting lotion/cream between the toes. This can make that area too moist and lead to an infection.  Diabetics should not routinely soak their feet which can increase the risk of infection.  Anyone with loss of feeling in the feet should always have a podiatrist exfoliate dry callused skin.  Redness, swelling, and pain in and around cracked skin are signs

## 2023-10-25 ENCOUNTER — OFFICE VISIT (OUTPATIENT)
Dept: UROLOGY | Facility: CLINIC | Age: 38
End: 2023-10-25
Payer: OTHER GOVERNMENT

## 2023-10-25 VITALS
WEIGHT: 312.38 LBS | HEART RATE: 78 BPM | HEIGHT: 70 IN | RESPIRATION RATE: 16 BRPM | SYSTOLIC BLOOD PRESSURE: 128 MMHG | DIASTOLIC BLOOD PRESSURE: 84 MMHG | BODY MASS INDEX: 44.72 KG/M2

## 2023-10-25 DIAGNOSIS — G47.33 OSA (OBSTRUCTIVE SLEEP APNEA): Primary | ICD-10-CM

## 2023-10-25 DIAGNOSIS — E34.9 TESTOSTERONE DEFICIENCY: ICD-10-CM

## 2023-10-25 PROCEDURE — 99999 PR PBB SHADOW E&M-EST. PATIENT-LVL IV: CPT | Mod: PBBFAC,,, | Performed by: UROLOGY

## 2023-10-25 PROCEDURE — 99214 PR OFFICE/OUTPT VISIT, EST, LEVL IV, 30-39 MIN: ICD-10-PCS | Mod: S$PBB,,, | Performed by: UROLOGY

## 2023-10-25 PROCEDURE — 99214 OFFICE O/P EST MOD 30 MIN: CPT | Mod: S$PBB,,, | Performed by: UROLOGY

## 2023-10-25 PROCEDURE — 99214 OFFICE O/P EST MOD 30 MIN: CPT | Mod: PBBFAC,PN | Performed by: UROLOGY

## 2023-10-25 PROCEDURE — 99999 PR PBB SHADOW E&M-EST. PATIENT-LVL IV: ICD-10-PCS | Mod: PBBFAC,,, | Performed by: UROLOGY

## 2023-10-25 RX ORDER — NEEDLES, DISPOSABLE 23GX1 1/2"
NEEDLE, DISPOSABLE MISCELLANEOUS
Qty: 12 EACH | Refills: 4 | Status: SHIPPED | OUTPATIENT
Start: 2023-10-25

## 2023-10-25 RX ORDER — TESTOSTERONE CYPIONATE 200 MG/ML
240 INJECTION, SOLUTION INTRAMUSCULAR
Qty: 10 ML | Refills: 2 | Status: SHIPPED | OUTPATIENT
Start: 2023-10-25 | End: 2024-04-24

## 2023-10-25 RX ORDER — SYRINGE, DISPOSABLE, 3 ML
SYRINGE, EMPTY DISPOSABLE MISCELLANEOUS
Qty: 12 EACH | Refills: 4 | Status: SHIPPED | OUTPATIENT
Start: 2023-10-25

## 2023-10-25 RX ORDER — NEEDLES, DISPOSABLE 25GX5/8"
NEEDLE, DISPOSABLE MISCELLANEOUS
Qty: 12 EACH | Refills: 4 | Status: SHIPPED | OUTPATIENT
Start: 2023-10-25

## 2023-10-25 NOTE — PROGRESS NOTES
CC: low T    History of Present Illness:     10/25/23-energy is stable. He can tell toward the end of an injection cycle that he is getting low. Labs done on day 7 show a T level of 445. No bothersome LUTS. Wears C-pap consistently.     1/20/23-Pt is currently on T cypionate 200mg IM Q28 day, which was switched by his PCP. Level recently was low at 256 at the end of the 4 weeks. When it was switched to 4 weeks, he was having low energy again. Sleep is good. Libido is good on T. No bothersome LUTS.  Had labs 1 month after starting T and had a level of 774 mid-cycle.     10/14/22-36yo male here for evaluation of low T. He has had 2 levels drawn, both under 300. He reports fatigue and weight gain. Reports low libido. No stranguria or weak stream. No gross hematuria. Has 1 child and doesn't desire any more.       Review of Systems   Constitutional:  Negative for chills and fever.   Respiratory:  Negative for shortness of breath.    Cardiovascular:  Negative for chest pain.   Genitourinary:  Negative for difficulty urinating.   All other systems reviewed and are negative.        Past Medical History:   Diagnosis Date    ADHD (attention deficit hyperactivity disorder)     Allergy     Anxiety     Asthma     Depression     GERD (gastroesophageal reflux disease)        Past Surgical History:   Procedure Laterality Date    ANKLE FRACTURE SURGERY Left 2006    ANKLE SURGERY Left 2006    bone infection    APPENDECTOMY      CARPAL TUNNEL RELEASE      KNEE ASPIRATION Right 2005       Family History   Problem Relation Age of Onset    Prostate cancer Neg Hx        Social History     Tobacco Use    Smoking status: Former     Current packs/day: 0.50     Average packs/day: 0.5 packs/day for 20.0 years (10.0 ttl pk-yrs)     Types: Cigarettes    Smokeless tobacco: Never   Substance Use Topics    Alcohol use: Yes     Comment: socially    Drug use: Never       Current Outpatient Medications   Medication Sig Dispense Refill    albuterol  "(ACCUNEB) 1.25 mg/3 mL Nebu Take 1.25 mg by nebulization every 6 (six) hours as needed. Rescue      budesonide-formoterol 80-4.5 mcg (SYMBICORT) 80-4.5 mcg/actuation HFAA Inhale 2 puffs into the lungs once daily. Controller 10.2 g 1    dulaglutide (TRULICITY) 3 mg/0.5 mL pen injector Inject 3 mg into the skin every 7 days. 4 pen 11    gabapentin (NEURONTIN) 300 MG capsule Take 1 capsule (300 mg total) by mouth every evening. May cause drowsiness 30 capsule 2    levocetirizine (XYZAL) 5 MG tablet TAKE 1 TABLET(5 MG) BY MOUTH EVERY EVENING 90 tablet 3    methocarbamoL (ROBAXIN) 500 MG Tab Take 1 tablet (500 mg total) by mouth 4 (four) times daily. for 10 days 40 tablet 0    montelukast (SINGULAIR) 10 mg tablet Take 10 mg by mouth every evening.      omeprazole (PRILOSEC) 40 MG capsule Take 40 mg by mouth once daily.      venlafaxine (EFFEXOR-XR) 150 MG Cp24 Take 150 mg by mouth once daily.      methylphenidate HCl (RITALIN) 10 MG tablet Take 1 tablet (10 mg total) by mouth 2 (two) times daily with meals. 60 tablet 0    needle, disp, 18 G (HYPODERMIC NEEDLES) 18 gauge x 1 1/2" Ndle Use as directed 12 each 4    needle, disp, 23 gauge (EASY TOUCH HYPODERMIC NEEDLE) 23 gauge x 1 1/2" Ndle Use as directed 12 each 4    syringe, disposable, 3 mL Syrg Use as directed 12 each 4    testosterone cypionate (DEPOTESTOTERONE CYPIONATE) 200 mg/mL injection Inject 1.2 mLs (240 mg total) into the muscle every 14 (fourteen) days. 10 mL 2     No current facility-administered medications for this visit.       Review of patient's allergies indicates:  No Known Allergies    Physical Exam  Vitals:    10/25/23 1335   BP: 128/84   Pulse: 78   Resp: 16       General: Well-developed, well-nourished in no acute distress  HEENT: Normocephalic, atraumatic, Extraocular movements intact  Neck: supple, trachea midline, no cervical or supraclavicular lymphadenopathy  Respirations: even and unlabored  Back: midline spine, no CVA " "tenderness  :10/14/22-circumcised male phallus without lesions, orthotopic urethral meatus, no inguinal hernia, no inguinal lymphadenopathy, no scrotal lesions, testicles descended bilaterally without mass or tenderness  Extremities: atraumatic, moves all equally, no clubbing, cyanosis or edema  Psych: normal affect  Skin: warm and dry, no lesions  Neuro: Alert and oriented, Cranial nerves II-XII grossly intact      Lab Results   Component Value Date    ALT 48 (H) 09/08/2023    AST 37 09/08/2023    ALKPHOS 95 09/08/2023    BILITOT 0.5 09/08/2023     Lab Results   Component Value Date    WBC 13.14 (H) 09/08/2023    HGB 16.4 09/08/2023    HCT 48.5 09/08/2023    MCV 83 09/08/2023     09/08/2023       PSA:   9/8/23: 0.35    Testosterone, Total   Date/Time Value Ref Range Status   09/08/2023 03:21  304 - 1227 ng/dL Final       Assessment:   1. DELL (obstructive sleep apnea)        2. Testosterone deficiency  testosterone cypionate (DEPOTESTOTERONE CYPIONATE) 200 mg/mL injection    Comprehensive Metabolic Panel    CBC Without Differential    Prostate Specific Antigen, Diagnostic    Testosterone              Plan:  DELL (obstructive sleep apnea)    Testosterone deficiency  -     testosterone cypionate (DEPOTESTOTERONE CYPIONATE) 200 mg/mL injection; Inject 1.2 mLs (240 mg total) into the muscle every 14 (fourteen) days.  Dispense: 10 mL; Refill: 2  -     Comprehensive Metabolic Panel; Future; Expected date: 04/11/2024  -     CBC Without Differential; Future; Expected date: 04/11/2024  -     Prostate Specific Antigen, Diagnostic; Future; Expected date: 04/11/2024  -     Testosterone; Future; Expected date: 04/11/2024    Other orders  -     needle, disp, 18 G (HYPODERMIC NEEDLES) 18 gauge x 1 1/2" Ndle; Use as directed  Dispense: 12 each; Refill: 4  -     needle, disp, 23 gauge (EASY TOUCH HYPODERMIC NEEDLE) 23 gauge x 1 1/2" Ndle; Use as directed  Dispense: 12 each; Refill: 4  -     syringe, disposable, 3 mL " Syrg; Use as directed  Dispense: 12 each; Refill: 4    Continue to wear C-pap.     Follow up in about 6 months (around 4/25/2024) for labs before visit.

## 2023-10-27 PROBLEM — M54.42 ACUTE BILATERAL LOW BACK PAIN WITH BILATERAL SCIATICA: Status: ACTIVE | Noted: 2023-10-27

## 2023-10-27 PROBLEM — M54.41 ACUTE BILATERAL LOW BACK PAIN WITH BILATERAL SCIATICA: Status: ACTIVE | Noted: 2023-10-27

## 2023-12-07 ENCOUNTER — TELEPHONE (OUTPATIENT)
Dept: UROLOGY | Facility: CLINIC | Age: 38
End: 2023-12-07
Payer: OTHER GOVERNMENT

## 2023-12-07 ENCOUNTER — PATIENT MESSAGE (OUTPATIENT)
Dept: UROLOGY | Facility: CLINIC | Age: 38
End: 2023-12-07
Payer: OTHER GOVERNMENT

## 2023-12-07 DIAGNOSIS — M54.42 ACUTE BILATERAL LOW BACK PAIN WITH BILATERAL SCIATICA: ICD-10-CM

## 2023-12-07 DIAGNOSIS — M54.41 ACUTE BILATERAL LOW BACK PAIN WITH BILATERAL SCIATICA: ICD-10-CM

## 2023-12-07 RX ORDER — GABAPENTIN 300 MG/1
CAPSULE ORAL
Refills: 0 | OUTPATIENT
Start: 2023-12-07

## 2023-12-07 NOTE — TELEPHONE ENCOUNTER
Refill Routing Note   Medication(s) are not appropriate for processing by [facility] for the following reason(s):        Outside of protocol    [facility] action(s):  Route     Requires labs : Yes             Appointments  past 12m or future 3m with PCP    Date Provider   Last Visit   10/23/2023 Kena Bourgeois MD   Next Visit   Visit date not found Kena Bourgeois MD   ED visits in past 90 days: 0        Note composed:2:19 PM 12/07/2023

## 2023-12-07 NOTE — TELEPHONE ENCOUNTER
gabapentin (NEURONTIN) 300 MG [id number]/23/2023----Sig - Route: Take 1 capsule (300 mg total) by mouth every evening. May cause drowsiness - OralSent to pharmacy as: gabapentin (NEURONTIN) 300 MG capsuleClass: NormalOrder: [id number]/Time Signed: 10/23/2023 11:20E-Prescribing Status: Receipt confirmed by pharmacy (10/23/2023 11:20 AM CDT)

## 2023-12-07 NOTE — TELEPHONE ENCOUNTER
Care Due:                  Date            Visit Type   Department     Provider  --------------------------------------------------------------------------------                                EP -                              PRIMARY      [facility] FAMILY  Last Visit: 10-      CARE ([facility])   MEDICINE       Kena Bourgeois  Next Visit: None Scheduled  None         None Found                                                            Last  Test          Frequency    Reason                     Performed    Due Date  --------------------------------------------------------------------------------    HBA1C.......  6 months...  dulaglutide..............  11- 05-    [facility] Embedded Care Due Messages. Reference number: [id number].   12/07/2023 1:28:00 PM CST

## 2023-12-08 ENCOUNTER — PATIENT MESSAGE (OUTPATIENT)
Dept: FAMILY MEDICINE | Facility: CLINIC | Age: 38
End: 2023-12-08
Payer: OTHER GOVERNMENT

## 2023-12-08 DIAGNOSIS — M54.41 ACUTE BILATERAL LOW BACK PAIN WITH BILATERAL SCIATICA: ICD-10-CM

## 2023-12-08 DIAGNOSIS — M54.42 ACUTE BILATERAL LOW BACK PAIN WITH BILATERAL SCIATICA: ICD-10-CM

## 2023-12-08 RX ORDER — GABAPENTIN 300 MG/1
300 CAPSULE ORAL DAILY
Qty: 90 CAPSULE | Refills: 3 | Status: SHIPPED | OUTPATIENT
Start: 2023-12-08 | End: 2024-12-07

## 2023-12-08 RX ORDER — METHOCARBAMOL 500 MG/1
TABLET, FILM COATED ORAL
Qty: 40 TABLET | Refills: 0 | Status: SHIPPED | OUTPATIENT
Start: 2023-12-08 | End: 2024-02-26

## 2023-12-08 RX ORDER — METHOCARBAMOL 500 MG/1
500 TABLET, FILM COATED ORAL 4 TIMES DAILY
Qty: 40 TABLET | Refills: 0 | Status: SHIPPED | OUTPATIENT
Start: 2023-12-08 | End: 2023-12-08 | Stop reason: SDUPTHER

## 2023-12-08 NOTE — TELEPHONE ENCOUNTER
No care due was identified.  [facility] Embedded Care Due Messages. Reference number: [id number].   12/08/2023 2:09:56 PM CST

## 2023-12-08 NOTE — TELEPHONE ENCOUNTER
I have signed for the following orders AND/OR meds.  Please call the patient and ask the patient to schedule the testing AND/OR inform about any medications that were sent.      No orders of the defined types were placed in this encounter.      Medications Ordered This Encounter   Medications    methocarbamoL (ROBAXIN) 500 MG Tab     Sig: Take 1 tablet (500 mg total) by mouth 4 (four) times daily. for 10 days     Dispense:  40 tablet     Refill:  0       [unfilled]

## 2024-01-04 ENCOUNTER — E-VISIT (OUTPATIENT)
Dept: FAMILY MEDICINE | Facility: CLINIC | Age: 39
End: 2024-01-04
Payer: OTHER GOVERNMENT

## 2024-01-04 ENCOUNTER — PATIENT MESSAGE (OUTPATIENT)
Dept: FAMILY MEDICINE | Facility: CLINIC | Age: 39
End: 2024-01-04
Payer: OTHER GOVERNMENT

## 2024-01-04 DIAGNOSIS — H92.09 OTALGIA, UNSPECIFIED LATERALITY: ICD-10-CM

## 2024-01-04 DIAGNOSIS — H66.90 OTITIS MEDIA, UNSPECIFIED LATERALITY, UNSPECIFIED OTITIS MEDIA TYPE: Primary | ICD-10-CM

## 2024-01-04 PROCEDURE — 99421 OL DIG E/M SVC 5-10 MIN: CPT | Mod: ,,, | Performed by: STUDENT IN AN ORGANIZED HEALTH CARE EDUCATION/TRAINING PROGRAM

## 2024-01-04 RX ORDER — NEOMYCIN SULFATE, POLYMYXIN B SULFATE AND HYDROCORTISONE 10; 3.5; 1 MG/ML; MG/ML; [USP'U]/ML
4 SUSPENSION/ DROPS AURICULAR (OTIC) 4 TIMES DAILY
Qty: 10 ML | Refills: 0 | Status: SHIPPED | OUTPATIENT
Start: 2024-01-04 | End: 2024-01-11

## 2024-01-04 NOTE — PROGRESS NOTES
Patient ID: Elton Barrios is a 38 y.o. male.    Chief Complaint: URI (Entered automatically based on patient selection in Patient Portal.)          274}  The patient initiated a request through [employer] on 1/4/2024 for evaluation and management with a chief complaint of URI (Entered automatically based on patient selection in Patient Portal.)     I evaluated the questionnaire submission on 01/04/2024 .    [facility] Peq Evisit Upper Respitatory/Cough Questionnaire    1/4/2024  1:34 PM CST - Filed by Patient   Do you agree to participate in an E-Visit? Yes   If you have any of the following symptoms, please present to your local ER or call 911:  I acknowledge   What is the main issue that you would like for your doctor to address today? Ear pain and pressure   Are you able to take your vital signs? No   What symptoms do you currently have?  Ear pain   Have you ever smoked? I have smoked in the past   Have you had a fever? No   When did your symptoms first appear? 12/31/2023   In the last two weeks, have you been in close contact with someone who has COVID-19 or the Flu? No   In the last two weeks, have you worked or volunteered in a healthcare facility or as a ? Healthcare facilities include a hospital, medical or dental clinic, long-term care facility, or nursing home No   Do you live in a long-term care facility, nursing home, group home, or homeless shelter? No   List what you have done or taken to help your symptoms. Ear drops, nasal spray   How severe are your symptoms? Moderate   Have your symptoms improved since they first appeared? No change   Have you taken an at home Covid test? No   Have you taken a Flu test? No   Have you been fully vaccinated for COVID? (2 Pfizer, 2 Moderna or 1 Jimy & Jimy vaccine injections) Yes   Have you received a booster? Yes   Have you recieved a Flu shot? Yes   When did you recieve your Flu shot? 12/6/2023   Do you have transportation to get tested for COVID if it  is indicated and ordered for you at an [facility] location? Yes   Provide any information you feel is important to your history not asked above After a recent flight, my left ear has been pressurized and will not pop. I have no hearing out of that ear and it is a bit painful. This has been nearly a week   Please attach any relevant images or files           Active Problem List with Overview Notes    Diagnosis Date Noted    Acute bilateral low back pain with bilateral sciatica 10/27/2023     Acute onset; no red flag sx    - will send for XR l spine and SI joints   - referral to Physical Therapy   - prn voltaren gel and msk relaxer, gabapentin  - prn tylenol, heat/ice therapy, stretches   -prn rtc       Metabolic syndrome 08/23/2023    Prediabetes 04/03/2023     Lab Results   Component Value Date    HGBA1C 5.5 11/11/2022     Diabetes Medications               dulaglutide (TRULICITY) 3 mg/0.5 mL pen injector Inject 3 mg into the skin every 7 days.              -discussed the importance of limiting sugary drinks (sodas, juices, sweet teas) and participating in regular daily exercise 30 min 3-5 days weekly.   3-6 m follow up    Due for repeat labs         Testosterone deficiency 09/06/2022     Lab Results   Component Value Date    TOTALTESTOST 445 09/08/2023     Lab Results   Component Value Date    WBC 13.14 (H) 09/08/2023    HGB 16.4 09/08/2023    HCT 48.5 09/08/2023    MCV 83 09/08/2023     09/08/2023     Follows with urology       Mild intermittent asthma 05/11/2022     Well controlled with Symbicort, and singular; seldom use of albuterol      Gastroesophageal reflux disease without esophagitis 05/11/2022     -symptoms controlled with daily PPI  -denies alarm symptoms, such as dysphagia, weight loss or N/V  -continue lifestyle modification with avoidance of acidic foods, caffeine, late night eating          PTSD (post-traumatic stress disorder) 05/11/2022     War ; stable on Effexor 75 mg ([facility])    Denies SI/HI; hallucinations         Attention deficit hyperactivity disorder (ADHD), combined type 05/11/2022     Chronic hx; doing well on methylphenidate 10 mg BID    Denies mood instability, irritability, aggression, palpitations, chest pain, weight loss, decreased appetite, disordered sleep  [facility] will write        Class 3 severe obesity with body mass index (BMI) of 40.0 to 44.9 in adult 05/11/2022     Wt Readings from Last 3 Encounters:   10/23/23 1059 (!) 142.6 kg (314 lb 4.8 oz)   09/20/23 1326 (!) 140.6 kg (310 lb)   08/23/23 1603 (!) 140.6 kg (310 lb)     Patient denies family history and personal history of thyroid cancer, pancreatic cancer or pancreatitis.    Switch from trulicity to ozempic as appetite is not suppressed, as it previously was     General weight loss/lifestyle modification strategies discussed: limit sugary drinks, exercise 3-5x per week  Informal exercise measures discussed, e.g. taking stairs instead of elevator.      - referral to bariatrics                   Recent Labs Obtained:  Lab Results   Component Value Date    WBC 13.14 (H) 09/08/2023    HGB 16.4 09/08/2023    HCT 48.5 09/08/2023    MCV 83 09/08/2023     09/08/2023     09/08/2023    K 3.8 09/08/2023    GLU 99 09/08/2023    CREATININE 0.9 09/08/2023    EGFRNORACEVR >60.0 09/08/2023    HGBA1C 5.5 11/11/2022      Review of patient's allergies indicates:  No Known Allergies    Encounter Diagnoses   Name Primary?    Otitis media, unspecified laterality, unspecified otitis media type Yes    Otalgia, unspecified laterality         No orders of the defined types were placed in this encounter.     Medications Ordered This Encounter   Medications    neomycin-polymyxin-hydrocortisone (CORTISPORIN) 3.5-10,000-1 mg/mL-unit/mL-% otic suspension     Sig: Place 4 drops into the left ear 4 (four) times daily. for 7 days     Dispense:  10 mL     Refill:  0        E-Visit Time Tracking:    Day 1 Time (in minutes): 5      Total Time (in minutes): 5    This is the extent of this pleasant patient's concerns at this present time. He did not feel chest pain upon exertion. No unilateral leg swelling, calf tenderness, or calf pain.    274}

## 2024-01-08 ENCOUNTER — OFFICE VISIT (OUTPATIENT)
Dept: FAMILY MEDICINE | Facility: CLINIC | Age: 39
End: 2024-01-08
Payer: OTHER GOVERNMENT

## 2024-01-08 DIAGNOSIS — Z03.89 NO DIAGNOSIS ON AXIS I: Primary | ICD-10-CM

## 2024-01-08 DIAGNOSIS — F48.9 NO DIAGNOSIS ON AXIS I: Primary | ICD-10-CM

## 2024-01-08 PROCEDURE — 99499 UNLISTED E&M SERVICE: CPT | Mod: 95,,, | Performed by: NURSE PRACTITIONER

## 2024-01-08 NOTE — PROGRESS NOTES
Pt scheduled for in clinic visit on tomorrow; had e-visit on 1/4/23 with another provider.    Answers submitted by the patient for this visit:  Ear Pain Questionnaire (Submitted on 1/8/2024)  Chief Complaint: Ear pain  Chronicity: new  Onset: 1 to 4 weeks ago  Progression since onset: gradually worsening  Frequency: constantly  Fever: no fever  Pain - numeric: 6/10  abdominal pain: No  ear discharge: No  rash: No  cough: No  headaches: Yes  rhinorrhea: No  diarrhea: No  hearing loss: Yes  sore throat: No  neck pain: No  vomiting: No  drainage: No  Treatments tried: NSAIDs, acetaminophen, ear drops  Improvement on treatment: no relief  Pain severity: moderate  chronic ear infection: No  hearing loss: Yes  tympanostomy tube: No

## 2024-02-24 DIAGNOSIS — M54.42 ACUTE BILATERAL LOW BACK PAIN WITH BILATERAL SCIATICA: ICD-10-CM

## 2024-02-24 DIAGNOSIS — M54.41 ACUTE BILATERAL LOW BACK PAIN WITH BILATERAL SCIATICA: ICD-10-CM

## 2024-02-25 NOTE — TELEPHONE ENCOUNTER
Care Due:                  Date            Visit Type   Department     Provider  --------------------------------------------------------------------------------                                EP -                              PRIMARY      [facility] FAMILY  Last Visit: 10-      CARE ([facility])   MEDICINE       Kena Bourgeois  Next Visit: None Scheduled  None         None Found                                                            Last  Test          Frequency    Reason                     Performed    Due Date  --------------------------------------------------------------------------------    HBA1C.......  6 months...  dulaglutide..............  11- 05-    [facility] Embedded Care Due Messages. Reference number: [id number].   2/24/2024 10:05:02 PM CST

## 2024-02-26 RX ORDER — METHOCARBAMOL 500 MG/1
TABLET, FILM COATED ORAL
Qty: 40 TABLET | Refills: 35 | Status: SHIPPED | OUTPATIENT
Start: 2024-02-26

## 2024-02-26 NOTE — TELEPHONE ENCOUNTER
Refill Routing Note   Medication(s) are not appropriate for processing by [facility] for the following reason(s):        Outside of protocol    [facility] action(s):  Route     Requires labs : Yes             Appointments  past 12m or future 3m with PCP    Date Provider   Last Visit   10/23/2023 Kena Bourgeois MD   Next Visit   Visit date not found Kena Bourgeois MD   ED visits in past 90 days: 0        Note composed:8:25 AM 02/26/2024

## 2024-04-08 ENCOUNTER — PATIENT MESSAGE (OUTPATIENT)
Dept: FAMILY MEDICINE | Facility: CLINIC | Age: 39
End: 2024-04-08
Payer: OTHER GOVERNMENT

## 2024-04-08 DIAGNOSIS — E34.9 TESTOSTERONE DEFICIENCY: Primary | ICD-10-CM

## 2024-04-08 NOTE — TELEPHONE ENCOUNTER
Orders Placed This Encounter   Procedures    Ambulatory referral/consult to Urology     Standing Status:   Future     Standing Expiration Date:   5/8/2025     Referral Priority:   Routine     Referral Type:   Consultation     Referral Reason:   Specialty Services Required     Referred to Provider:   Renate Foote MD     Requested Specialty:   Urology     Number of Visits Requested:   1

## 2024-04-26 ENCOUNTER — LAB VISIT (OUTPATIENT)
Dept: LAB | Facility: HOSPITAL | Age: 39
End: 2024-04-26
Attending: UROLOGY
Payer: OTHER GOVERNMENT

## 2024-04-26 ENCOUNTER — PATIENT MESSAGE (OUTPATIENT)
Dept: FAMILY MEDICINE | Facility: CLINIC | Age: 39
End: 2024-04-26
Payer: OTHER GOVERNMENT

## 2024-04-26 DIAGNOSIS — E34.9 TESTOSTERONE DEFICIENCY: ICD-10-CM

## 2024-04-26 DIAGNOSIS — E66.01 CLASS 3 SEVERE OBESITY WITH BODY MASS INDEX (BMI) OF 40.0 TO 44.9 IN ADULT, UNSPECIFIED OBESITY TYPE, UNSPECIFIED WHETHER SERIOUS COMORBIDITY PRESENT: Primary | ICD-10-CM

## 2024-04-26 LAB
ALBUMIN SERPL BCP-MCNC: 4.3 G/DL (ref 3.5–5.2)
ALP SERPL-CCNC: 93 U/L (ref 55–135)
ALT SERPL W/O P-5'-P-CCNC: 48 U/L (ref 10–44)
ANION GAP SERPL CALC-SCNC: 8 MMOL/L (ref 8–16)
AST SERPL-CCNC: 39 U/L (ref 10–40)
BILIRUB SERPL-MCNC: 0.6 MG/DL (ref 0.1–1)
BUN SERPL-MCNC: 9 MG/DL (ref 6–20)
CALCIUM SERPL-MCNC: 9.6 MG/DL (ref 8.7–10.5)
CHLORIDE SERPL-SCNC: 107 MMOL/L (ref 95–110)
CO2 SERPL-SCNC: 25 MMOL/L (ref 23–29)
CREAT SERPL-MCNC: 1 MG/DL (ref 0.5–1.4)
EST. GFR  (NO RACE VARIABLE): >60 ML/MIN/1.73 M^2
GLUCOSE SERPL-MCNC: 92 MG/DL (ref 70–110)
POTASSIUM SERPL-SCNC: 3.7 MMOL/L (ref 3.5–5.1)
PROT SERPL-MCNC: 7.5 G/DL (ref 6–8.4)
SODIUM SERPL-SCNC: 140 MMOL/L (ref 136–145)

## 2024-04-26 PROCEDURE — 36415 COLL VENOUS BLD VENIPUNCTURE: CPT | Mod: PO | Performed by: UROLOGY

## 2024-04-26 PROCEDURE — 84403 ASSAY OF TOTAL TESTOSTERONE: CPT | Performed by: UROLOGY

## 2024-04-26 PROCEDURE — 85027 COMPLETE CBC AUTOMATED: CPT | Performed by: UROLOGY

## 2024-04-26 PROCEDURE — 84153 ASSAY OF PSA TOTAL: CPT | Performed by: UROLOGY

## 2024-04-26 PROCEDURE — 80053 COMPREHEN METABOLIC PANEL: CPT | Performed by: UROLOGY

## 2024-04-26 RX ORDER — TIRZEPATIDE 2.5 MG/.5ML
2.5 INJECTION, SOLUTION SUBCUTANEOUS
Qty: 1 PEN | Refills: 4 | Status: SHIPPED | OUTPATIENT
Start: 2024-04-26

## 2024-04-26 NOTE — TELEPHONE ENCOUNTER
Orders Placed This Encounter   Procedures    Ambulatory referral/consult to [facility]     Standing Status:   Future     Standing Expiration Date:   5/26/2025     Referral Priority:   Routine     Referral Type:   Consultation     Referral Reason:   Specialty Services Required     Requested Specialty:   Internal Medicine     Number of Visits Requested:   1       Medications Ordered This Encounter   Medications    tirzepatide (MOUNJARO) 2.5 mg/0.5 mL PnIj     Sig: Inject 2.5 mg into the skin every 7 days.     Dispense:  1 Pen     Refill:  4

## 2024-04-27 LAB
COMPLEXED PSA SERPL-MCNC: 0.29 NG/ML (ref 0–4)
ERYTHROCYTE [DISTWIDTH] IN BLOOD BY AUTOMATED COUNT: 13.2 % (ref 11.5–14.5)
HCT VFR BLD AUTO: 49.6 % (ref 40–54)
HGB BLD-MCNC: 16.9 G/DL (ref 14–18)
MCH RBC QN AUTO: 28.6 PG (ref 27–31)
MCHC RBC AUTO-ENTMCNC: 34.1 G/DL (ref 32–36)
MCV RBC AUTO: 84 FL (ref 82–98)
PLATELET # BLD AUTO: 256 K/UL (ref 150–450)
PMV BLD AUTO: 10.1 FL (ref 9.2–12.9)
RBC # BLD AUTO: 5.91 M/UL (ref 4.6–6.2)
TESTOST SERPL-MCNC: 956 NG/DL (ref 304–1227)
WBC # BLD AUTO: 13.03 K/UL (ref 3.9–12.7)

## 2024-04-30 ENCOUNTER — OFFICE VISIT (OUTPATIENT)
Dept: UROLOGY | Facility: CLINIC | Age: 39
End: 2024-04-30
Payer: OTHER GOVERNMENT

## 2024-04-30 VITALS
BODY MASS INDEX: 45.1 KG/M2 | DIASTOLIC BLOOD PRESSURE: 84 MMHG | HEIGHT: 70 IN | HEART RATE: 71 BPM | TEMPERATURE: 98 F | RESPIRATION RATE: 18 BRPM | WEIGHT: 315 LBS | SYSTOLIC BLOOD PRESSURE: 132 MMHG

## 2024-04-30 DIAGNOSIS — G47.33 OSA (OBSTRUCTIVE SLEEP APNEA): ICD-10-CM

## 2024-04-30 DIAGNOSIS — E66.01 CLASS 3 SEVERE OBESITY DUE TO EXCESS CALORIES WITH SERIOUS COMORBIDITY AND BODY MASS INDEX (BMI) OF 40.0 TO 44.9 IN ADULT: ICD-10-CM

## 2024-04-30 DIAGNOSIS — E34.9 TESTOSTERONE DEFICIENCY: Primary | ICD-10-CM

## 2024-04-30 DIAGNOSIS — F52.32 ANORGASMIA OF MALE: ICD-10-CM

## 2024-04-30 LAB
BILIRUB UR QL STRIP: NEGATIVE
GLUCOSE UR QL STRIP: NEGATIVE
KETONES UR QL STRIP: POSITIVE
LEUKOCYTE ESTERASE UR QL STRIP: NEGATIVE
PH, POC UA: 6
POC BLOOD, URINE: NEGATIVE
POC NITRATES, URINE: NEGATIVE
PROT UR QL STRIP: POSITIVE
SP GR UR STRIP: 1.02 (ref 1–1.03)
UROBILINOGEN UR STRIP-ACNC: 0.2 (ref 0.3–2.2)

## 2024-04-30 PROCEDURE — 99214 OFFICE O/P EST MOD 30 MIN: CPT | Mod: PBBFAC,PN | Performed by: UROLOGY

## 2024-04-30 PROCEDURE — 99999 PR PBB SHADOW E&M-EST. PATIENT-LVL IV: CPT | Mod: PBBFAC,,, | Performed by: UROLOGY

## 2024-04-30 PROCEDURE — 99214 OFFICE O/P EST MOD 30 MIN: CPT | Mod: S$PBB,,, | Performed by: UROLOGY

## 2024-04-30 PROCEDURE — 99999PBSHW POCT URINALYSIS, DIPSTICK, AUTOMATED, W/O SCOPE: Mod: PBBFAC,,,

## 2024-04-30 PROCEDURE — 81003 URINALYSIS AUTO W/O SCOPE: CPT | Mod: PBBFAC,PN | Performed by: UROLOGY

## 2024-04-30 RX ORDER — TESTOSTERONE CYPIONATE 200 MG/ML
240 INJECTION, SOLUTION INTRAMUSCULAR
Qty: 10 ML | Refills: 2 | Status: SHIPPED | OUTPATIENT
Start: 2024-04-30 | End: 2024-10-29

## 2024-04-30 NOTE — PROGRESS NOTES
CC: low T    History of Present Illness:     4/30/24-T level is good at 956 at mid-range, pt taking 240mg T cyp Q14 days. He has had some anorgasmia and reduced sensation in his penis (and sometimes below the waist in general when sitting for a while), ongoing for 6-9 months or so. Doesn't correlate with the timing of injections. Back hurts sometimes. He states that he had an x-ray of his back and was told that it was fine, but he was started on gabapentin. He continues to wear C-pap.     10/25/23-energy is stable. He can tell toward the end of an injection cycle that he is getting low. Labs done on day 7 show a T level of 445. No bothersome LUTS. Wears C-pap consistently.     1/20/23-Pt is currently on T cypionate 200mg IM Q28 day, which was switched by his PCP. Level recently was low at 256 at the end of the 4 weeks. When it was switched to 4 weeks, he was having low energy again. Sleep is good. Libido is good on T. No bothersome LUTS.  Had labs 1 month after starting T and had a level of 774 mid-cycle.     10/14/22-38yo male here for evaluation of low T. He has had 2 levels drawn, both under 300. He reports fatigue and weight gain. Reports low libido. No stranguria or weak stream. No gross hematuria. Has 1 child and doesn't desire any more.       Review of Systems   Constitutional:  Negative for chills and fever.   Respiratory:  Negative for shortness of breath.    Cardiovascular:  Negative for chest pain.   Genitourinary:  Negative for difficulty urinating.   Musculoskeletal:  Positive for back pain (occasionally).   Neurological:  Positive for numbness.   All other systems reviewed and are negative.        Past Medical History:   Diagnosis Date    ADHD (attention deficit hyperactivity disorder)     Allergy     Anxiety     Asthma     Depression     GERD (gastroesophageal reflux disease)        Past Surgical History:   Procedure Laterality Date    ANKLE FRACTURE SURGERY Left 2006    ANKLE SURGERY Left 2006     "bone infection    APPENDECTOMY      CARPAL TUNNEL RELEASE      KNEE ASPIRATION Right 2005       Family History   Problem Relation Name Age of Onset    Prostate cancer Neg Hx         Social History     Tobacco Use    Smoking status: Former     Current packs/day: 0.50     Average packs/day: 0.5 packs/day for 20.0 years (10.0 ttl pk-yrs)     Types: Cigarettes    Smokeless tobacco: Never   Substance Use Topics    Alcohol use: Yes     Comment: socially    Drug use: Never       Current Outpatient Medications   Medication Sig Dispense Refill    albuterol (ACCUNEB) 1.25 mg/3 mL Nebu Take 1.25 mg by nebulization every 6 (six) hours as needed. Rescue      budesonide-formoterol 80-4.5 mcg (SYMBICORT) 80-4.5 mcg/actuation HFAA Inhale 2 puffs into the lungs once daily. Controller 10.2 g 1    gabapentin (NEURONTIN) 300 MG capsule Take 1 capsule (300 mg total) by mouth every evening. May cause drowsiness 30 capsule 2    gabapentin (NEURONTIN) 300 MG capsule Take 1 capsule (300 mg total) by mouth once daily. 90 capsule 3    levocetirizine (XYZAL) 5 MG tablet TAKE 1 TABLET(5 MG) BY MOUTH EVERY EVENING 90 tablet 3    methocarbamoL (ROBAXIN) 500 MG Tab TAKE 1 TABLET FOUR TIMES A DAY FOR 10 DAYS 40 tablet 35    methylphenidate HCl (RITALIN) 10 MG tablet Take 1 tablet (10 mg total) by mouth 2 (two) times daily with meals. 60 tablet 0    montelukast (SINGULAIR) 10 mg tablet Take 10 mg by mouth every evening.      needle, disp, 18 G (HYPODERMIC NEEDLES) 18 gauge x 1 1/2" Ndle Use as directed 12 each 4    needle, disp, 23 gauge (EASY TOUCH HYPODERMIC NEEDLE) 23 gauge x 1 1/2" Ndle Use as directed 12 each 4    omeprazole (PRILOSEC) 40 MG capsule Take 40 mg by mouth once daily.      syringe, disposable, 3 mL Syrg Use as directed 12 each 4    testosterone cypionate (DEPOTESTOTERONE CYPIONATE) 200 mg/mL injection Inject 1.2 mLs (240 mg total) into the muscle every 14 (fourteen) days. 10 mL 2    tirzepatide (MOUNJARO) 2.5 mg/0.5 mL PnIj Inject " 2.5 mg into the skin every 7 days. 1 Pen 4    venlafaxine (EFFEXOR-XR) 150 MG Cp24 Take 150 mg by mouth once daily.       No current facility-administered medications for this visit.       Review of patient's allergies indicates:  No Known Allergies    Physical Exam  Vitals:    04/30/24 0852   BP: 132/84   Pulse: 71   Resp: 18   Temp: 97.9 °F (36.6 °C)         General: Well-developed, well-nourished in no acute distress  HEENT: Normocephalic, atraumatic, Extraocular movements intact  Neck: supple, trachea midline, no cervical or supraclavicular lymphadenopathy  Respirations: even and unlabored  Back: midline spine, no CVA tenderness  :4/30/24-circumcised male phallus without lesions, orthotopic urethral meatus, no inguinal hernia, no inguinal lymphadenopathy, no scrotal lesions, testicles descended bilaterally without mass or tenderness  Extremities: atraumatic, moves all equally, no clubbing, cyanosis or edema  Psych: normal affect  Skin: warm and dry, no lesions  Neuro: Alert and oriented, Cranial nerves II-XII grossly intact      Lab Results   Component Value Date    ALT 48 (H) 04/26/2024    AST 39 04/26/2024    ALKPHOS 93 04/26/2024    BILITOT 0.6 04/26/2024     Lab Results   Component Value Date    WBC 13.03 (H) 04/26/2024    HGB 16.9 04/26/2024    HCT 49.6 04/26/2024    MCV 84 04/26/2024     04/26/2024       PSA:   9/8/23: 0.35  4/26/24: 0.29    Testosterone, Total   Date/Time Value Ref Range Status   04/26/2024 02:46  304 - 1227 ng/dL Final       Assessment:   1. Testosterone deficiency  Ambulatory referral/consult to Urology    POCT Urinalysis, Dipstick, Automated, W/O Scope    testosterone cypionate (DEPOTESTOTERONE CYPIONATE) 200 mg/mL injection      2. DELL (obstructive sleep apnea)        3. Class 3 severe obesity due to excess calories with serious comorbidity and body mass index (BMI) of 40.0 to 44.9 in adult        4. Anorgasmia of male                  Plan:  Testosterone deficiency  -      Ambulatory referral/consult to Urology  -     POCT Urinalysis, Dipstick, Automated, W/O Scope  -     testosterone cypionate (DEPOTESTOTERONE CYPIONATE) 200 mg/mL injection; Inject 1.2 mLs (240 mg total) into the muscle every 14 (fourteen) days.  Dispense: 10 mL; Refill: 2    DELL (obstructive sleep apnea)    Class 3 severe obesity due to excess calories with serious comorbidity and body mass index (BMI) of 40.0 to 44.9 in adult    Anorgasmia of male    Discussed potential etiologies of anorgasmia and penile numbness to be related to his back issues and/or medication side effects.   Continue to wear C-pap.   Pt moving to [place] and will establish with a urologist there.

## 2024-05-13 ENCOUNTER — TELEPHONE (OUTPATIENT)
Dept: FAMILY MEDICINE | Facility: CLINIC | Age: 39
End: 2024-05-13
Payer: OTHER GOVERNMENT

## 2024-05-13 ENCOUNTER — OFFICE VISIT (OUTPATIENT)
Dept: FAMILY MEDICINE | Facility: CLINIC | Age: 39
End: 2024-05-13
Payer: OTHER GOVERNMENT

## 2024-05-13 DIAGNOSIS — F90.2 ATTENTION DEFICIT HYPERACTIVITY DISORDER (ADHD), COMBINED TYPE: ICD-10-CM

## 2024-05-13 PROCEDURE — 99213 OFFICE O/P EST LOW 20 MIN: CPT | Mod: 95,,, | Performed by: STUDENT IN AN ORGANIZED HEALTH CARE EDUCATION/TRAINING PROGRAM

## 2024-05-13 RX ORDER — METHYLPHENIDATE HYDROCHLORIDE 10 MG/1
10 TABLET ORAL 2 TIMES DAILY WITH MEALS
Qty: 60 TABLET | Refills: 0 | Status: SHIPPED | OUTPATIENT
Start: 2024-05-13 | End: 2024-06-12

## 2024-05-13 RX ORDER — METHYLPHENIDATE HYDROCHLORIDE 10 MG/1
10 TABLET ORAL 2 TIMES DAILY WITH MEALS
Qty: 60 TABLET | Refills: 0 | OUTPATIENT
Start: 2024-05-13 | End: 2024-06-12

## 2024-05-13 NOTE — PROGRESS NOTES
The patient location is: [place]  The chief complaint leading to consultation is: add med refill    Visit type: audiovisual    Time with patient: 5 minutes  10 minutes of total time spent on the encounter, which includes face to face time and non-face to face time preparing to see the patient (eg, review of tests), Obtaining and/or reviewing separately obtained history, Documenting clinical information in the electronic or other health record, Independently interpreting results (not separately reported) and communicating results to the patient/family/caregiver, or Care coordination (not separately reported).     Each patient to whom he or she provides medical services by telemedicine is:  (1) informed of the relationship between the physician and patient and the respective role of any other health care provider with respect to management of the patient; and (2) notified that he or she may decline to receive medical services by telemedicine and may withdraw from such care at any time.       Problem List Items Addressed This Visit          Psychiatric    Attention deficit hyperactivity disorder (ADHD), combined type    Overview     Chronic hx; doing well on methylphenidate 10 mg BID    Denies mood instability, irritability, aggression, palpitations, chest pain, weight loss, decreased appetite, disordered sleep  Pt is demonstrating no behaviors to suggest inappropriate use of prescribed medications.    [place] Board  Pharmacy Controlled Prescription Drug Monitoring database was queried and showed no activity to suggest abuse, diversion, or other inappropriate use of prescription medications.  #30 tabs, 0 refills   Moving to [place], est with psych in [place] for refills              Relevant Medications    methylphenidate HCl (RITALIN) 10 MG tablet           Follow up if symptoms worsen or fail to improve.    Kena Bourgeois MD  _________________________________________________________________________      Patient ID: Elton  "Sophie is a 39 y.o. male.    Pt diagnosed with ADD/ADHD in 2013. Has tried methylphenidate 20mg, last had couple years ago. Restarted on ritalin 10 mg bid in 2022. This medication helps to control lack of focus. Denies mood instability, irritability, aggression, palpitations, chest pain, weight loss, decreased appetite, disordered sleep.     Past medical histories reviewed, including past medical, surgical, family and social histories.      Current Outpatient Medications on File Prior to Visit   Medication Sig Dispense Refill    albuterol (ACCUNEB) 1.25 mg/3 mL Nebu Take 1.25 mg by nebulization every 6 (six) hours as needed. Rescue      budesonide-formoterol 80-4.5 mcg (SYMBICORT) 80-4.5 mcg/actuation HFAA Inhale 2 puffs into the lungs once daily. Controller 10.2 g 1    gabapentin (NEURONTIN) 300 MG capsule Take 1 capsule (300 mg total) by mouth every evening. May cause drowsiness 30 capsule 2    gabapentin (NEURONTIN) 300 MG capsule Take 1 capsule (300 mg total) by mouth once daily. 90 capsule 3    levocetirizine (XYZAL) 5 MG tablet TAKE 1 TABLET(5 MG) BY MOUTH EVERY EVENING 90 tablet 3    methocarbamoL (ROBAXIN) 500 MG Tab TAKE 1 TABLET FOUR TIMES A DAY FOR 10 DAYS 40 tablet 35    montelukast (SINGULAIR) 10 mg tablet Take 10 mg by mouth every evening.      needle, disp, 18 G (HYPODERMIC NEEDLES) 18 gauge x 1 1/2" Ndle Use as directed 12 each 4    needle, disp, 23 gauge (EASY TOUCH HYPODERMIC NEEDLE) 23 gauge x 1 1/2" Ndle Use as directed 12 each 4    omeprazole (PRILOSEC) 40 MG capsule Take 40 mg by mouth once daily.      syringe, disposable, 3 mL Syrg Use as directed 12 each 4    testosterone cypionate (DEPOTESTOTERONE CYPIONATE) 200 mg/mL injection Inject 1.2 mLs (240 mg total) into the muscle every 14 (fourteen) days. 10 mL 2    tirzepatide (MOUNJARO) 2.5 mg/0.5 mL PnIj Inject 2.5 mg into the skin every 7 days. 1 Pen 4    venlafaxine (EFFEXOR-XR) 150 MG Cp24 Take 150 mg by mouth once daily.      " [DISCONTINUED] methylphenidate HCl (RITALIN) 10 MG tablet Take 1 tablet (10 mg total) by mouth 2 (two) times daily with meals. 60 tablet 0     No current facility-administered medications on file prior to visit.       Review of Systems   12 point review of systems negative except for listed in HPI.     Objective:    Nursing note and vitals reviewed.  There were no vitals filed for this visit.  There is no height or weight on file to calculate BMI.     Physical Exam   No vitals or full physical exam obtained as this is a virtual visit  Gen: no distress, comfortable          We Offer Telehealth & Same Day Appointments!   Book your Telehealth appointment with me through my nurse or   Clinic appointments on [employer] logo!  [phone]     To Schedule appointments online, go to [employer] logo: [email or web address]       Answers submitted by the patient for this visit:  Review of Systems Questionnaire (Submitted on 5/13/2024)  activity change: No  unexpected weight change: No  neck pain: No  hearing loss: No  rhinorrhea: No  trouble swallowing: No  eye discharge: No  visual disturbance: No  chest tightness: No  wheezing: No  chest pain: No  palpitations: No  blood in stool: No  constipation: No  vomiting: No  diarrhea: No  polydipsia: No  polyuria: No  difficulty urinating: No  urgency: No  hematuria: No  joint swelling: No  arthralgias: No  headaches: No  weakness: No  confusion: No  dysphoric mood: No

## 2024-05-13 NOTE — TELEPHONE ENCOUNTER
Care Due:                  Date            Visit Type   Department     Provider  --------------------------------------------------------------------------------                                EP -                              PRIMARY      [facility] FAMILY  Last Visit: 10-      CARE ([facility])   MEDICINE       Kena Bourgeois  Next Visit: None Scheduled  None         None Found                                                            Last  Test          Frequency    Reason                     Performed    Due Date  --------------------------------------------------------------------------------    HBA1C.......  6 months...  tirzepatide..............  11- 05-    [facility] Embedded Care Due Messages. Reference number: [id number].   5/13/2024 11:33:38 AM CDT

## 2024-12-12 DIAGNOSIS — M54.42 ACUTE BILATERAL LOW BACK PAIN WITH BILATERAL SCIATICA: ICD-10-CM

## 2024-12-12 DIAGNOSIS — M54.41 ACUTE BILATERAL LOW BACK PAIN WITH BILATERAL SCIATICA: ICD-10-CM

## 2024-12-12 RX ORDER — GABAPENTIN 300 MG/1
300 CAPSULE ORAL
Qty: 90 CAPSULE | Refills: 3 | Status: SHIPPED | OUTPATIENT
Start: 2024-12-12

## 2024-12-12 NOTE — TELEPHONE ENCOUNTER
Care Due:                  Date            Visit Type   Department     Provider  --------------------------------------------------------------------------------                                ESTABLISHED                              PATIENT -    [facility] FAMILY  Last Visit: 05-      [facility]       Kena Bourgeois  Next Visit: None Scheduled  None         None Found                                                            Last  Test          Frequency    Reason                     Performed    Due Date  --------------------------------------------------------------------------------    HBA1C.......  6 months...  tirzepatide..............  11- 05-    [facility] Embedded Care Due Messages. Reference number: [id number].   12/12/2024 9:25:21 AM CST

## 2024-12-12 NOTE — TELEPHONE ENCOUNTER
Refill Routing Note   Medication(s) are not appropriate for processing by [facility] for the following reason(s):        Outside of protocol    [facility] action(s):  Route     Requires labs : Yes             Appointments  past 12m or future 3m with PCP    Date Provider   Last Visit   5/13/2024 Kena Bourgeois MD   Next Visit   Visit date not found Kena Bourgeois MD   ED visits in past 90 days: 0        Note composed:11:25 AM 12/12/2024
